# Patient Record
Sex: FEMALE | Race: BLACK OR AFRICAN AMERICAN | NOT HISPANIC OR LATINO | Employment: FULL TIME | URBAN - METROPOLITAN AREA
[De-identification: names, ages, dates, MRNs, and addresses within clinical notes are randomized per-mention and may not be internally consistent; named-entity substitution may affect disease eponyms.]

---

## 2022-05-15 ENCOUNTER — HOSPITAL ENCOUNTER (OUTPATIENT)
Facility: HOSPITAL | Age: 27
Setting detail: OBSERVATION
Discharge: HOME | End: 2022-05-18
Attending: EMERGENCY MEDICINE | Admitting: INTERNAL MEDICINE
Payer: COMMERCIAL

## 2022-05-15 DIAGNOSIS — N12 PYELONEPHRITIS: Primary | ICD-10-CM

## 2022-05-15 DIAGNOSIS — Z3A.01 LESS THAN 8 WEEKS GESTATION OF PREGNANCY: ICD-10-CM

## 2022-05-15 LAB
ALBUMIN SERPL BCP-MCNC: 4.6 G/DL (ref 3.5–5)
ALP SERPL-CCNC: 48 U/L (ref 34–104)
ALT SERPL W P-5'-P-CCNC: 9 U/L (ref 7–52)
ANION GAP SERPL CALCULATED.3IONS-SCNC: 15 MMOL/L (ref 4–13)
ANISOCYTOSIS BLD QL SMEAR: PRESENT
AST SERPL W P-5'-P-CCNC: 9 U/L (ref 13–39)
BACTERIA UR QL AUTO: ABNORMAL /HPF
BASOPHILS # BLD MANUAL: 0 THOUSAND/UL (ref 0–0.1)
BASOPHILS NFR MAR MANUAL: 0 % (ref 0–1)
BILIRUB SERPL-MCNC: 0.76 MG/DL (ref 0.2–1)
BILIRUB UR QL STRIP: NEGATIVE
BUN SERPL-MCNC: 4 MG/DL (ref 5–25)
CALCIUM SERPL-MCNC: 10.1 MG/DL (ref 8.4–10.2)
CHLORIDE SERPL-SCNC: 95 MMOL/L (ref 96–108)
CLARITY UR: ABNORMAL
CO2 SERPL-SCNC: 19 MMOL/L (ref 21–32)
COLOR UR: YELLOW
CREAT SERPL-MCNC: 0.85 MG/DL (ref 0.6–1.3)
EOSINOPHIL # BLD MANUAL: 0 THOUSAND/UL (ref 0–0.4)
EOSINOPHIL NFR BLD MANUAL: 0 % (ref 0–6)
ERYTHROCYTE [DISTWIDTH] IN BLOOD BY AUTOMATED COUNT: 13.4 % (ref 11.6–15.1)
FLUAV RNA RESP QL NAA+PROBE: NEGATIVE
FLUBV RNA RESP QL NAA+PROBE: NEGATIVE
GFR SERPL CREATININE-BSD FRML MDRD: 93 ML/MIN/1.73SQ M
GLUCOSE SERPL-MCNC: 113 MG/DL (ref 65–140)
GLUCOSE UR STRIP-MCNC: NEGATIVE MG/DL
HCG SERPL QL: POSITIVE
HCT VFR BLD AUTO: 32.7 % (ref 34.8–46.1)
HGB BLD-MCNC: 11.2 G/DL (ref 11.5–15.4)
HGB UR QL STRIP.AUTO: ABNORMAL
KETONES UR STRIP-MCNC: ABNORMAL MG/DL
LACTATE SERPL-SCNC: 1.6 MMOL/L (ref 0.5–2)
LEUKOCYTE ESTERASE UR QL STRIP: ABNORMAL
LG PLATELETS BLD QL SMEAR: PRESENT
LYMPHOCYTES # BLD AUTO: 10 % (ref 14–44)
LYMPHOCYTES # BLD AUTO: 2.51 THOUSAND/UL (ref 0.6–4.47)
MCH RBC QN AUTO: 23.2 PG (ref 26.8–34.3)
MCHC RBC AUTO-ENTMCNC: 34.3 G/DL (ref 31.4–37.4)
MCV RBC AUTO: 68 FL (ref 82–98)
MONOCYTES # BLD AUTO: 2.26 THOUSAND/UL (ref 0–1.22)
MONOCYTES NFR BLD: 9 % (ref 4–12)
NEUTROPHILS # BLD MANUAL: 20.32 THOUSAND/UL (ref 1.85–7.62)
NEUTS SEG NFR BLD AUTO: 81 % (ref 43–75)
NITRITE UR QL STRIP: NEGATIVE
NON-SQ EPI CELLS URNS QL MICRO: ABNORMAL /HPF
OTHER STN SPEC: ABNORMAL
OVALOCYTES BLD QL SMEAR: PRESENT
PH UR STRIP.AUTO: 7.5 [PH]
PLATELET # BLD AUTO: 262 THOUSANDS/UL (ref 149–390)
PLATELET BLD QL SMEAR: ADEQUATE
PMV BLD AUTO: 10.5 FL (ref 8.9–12.7)
POTASSIUM SERPL-SCNC: 3.3 MMOL/L (ref 3.5–5.3)
PROT SERPL-MCNC: 8.5 G/DL (ref 6.4–8.4)
PROT UR STRIP-MCNC: ABNORMAL MG/DL
RBC # BLD AUTO: 4.82 MILLION/UL (ref 3.81–5.12)
RBC #/AREA URNS AUTO: ABNORMAL /HPF
RBC MORPH BLD: PRESENT
RSV RNA RESP QL NAA+PROBE: NEGATIVE
SARS-COV-2 RNA RESP QL NAA+PROBE: NEGATIVE
SODIUM SERPL-SCNC: 129 MMOL/L (ref 135–147)
SP GR UR STRIP.AUTO: 1.01 (ref 1–1.03)
UROBILINOGEN UR QL STRIP.AUTO: 0.2 E.U./DL
WBC # BLD AUTO: 25.09 THOUSAND/UL (ref 4.31–10.16)
WBC #/AREA URNS AUTO: ABNORMAL /HPF
WBC CLUMPS # UR AUTO: PRESENT /UL

## 2022-05-15 PROCEDURE — 85027 COMPLETE CBC AUTOMATED: CPT | Performed by: EMERGENCY MEDICINE

## 2022-05-15 PROCEDURE — 81001 URINALYSIS AUTO W/SCOPE: CPT | Performed by: EMERGENCY MEDICINE

## 2022-05-15 PROCEDURE — 96365 THER/PROPH/DIAG IV INF INIT: CPT

## 2022-05-15 PROCEDURE — 87086 URINE CULTURE/COLONY COUNT: CPT | Performed by: EMERGENCY MEDICINE

## 2022-05-15 PROCEDURE — 99285 EMERGENCY DEPT VISIT HI MDM: CPT

## 2022-05-15 PROCEDURE — 36415 COLL VENOUS BLD VENIPUNCTURE: CPT | Performed by: EMERGENCY MEDICINE

## 2022-05-15 PROCEDURE — 84703 CHORIONIC GONADOTROPIN ASSAY: CPT | Performed by: EMERGENCY MEDICINE

## 2022-05-15 PROCEDURE — 87077 CULTURE AEROBIC IDENTIFY: CPT | Performed by: EMERGENCY MEDICINE

## 2022-05-15 PROCEDURE — 80053 COMPREHEN METABOLIC PANEL: CPT | Performed by: EMERGENCY MEDICINE

## 2022-05-15 PROCEDURE — 85007 BL SMEAR W/DIFF WBC COUNT: CPT | Performed by: EMERGENCY MEDICINE

## 2022-05-15 PROCEDURE — 87040 BLOOD CULTURE FOR BACTERIA: CPT | Performed by: EMERGENCY MEDICINE

## 2022-05-15 PROCEDURE — 0241U HB NFCT DS VIR RESP RNA 4 TRGT: CPT | Performed by: EMERGENCY MEDICINE

## 2022-05-15 PROCEDURE — 83605 ASSAY OF LACTIC ACID: CPT | Performed by: EMERGENCY MEDICINE

## 2022-05-15 PROCEDURE — 87186 SC STD MICRODIL/AGAR DIL: CPT | Performed by: EMERGENCY MEDICINE

## 2022-05-15 PROCEDURE — 99284 EMERGENCY DEPT VISIT MOD MDM: CPT | Performed by: EMERGENCY MEDICINE

## 2022-05-15 RX ORDER — CEFTRIAXONE 1 G/50ML
1000 INJECTION, SOLUTION INTRAVENOUS ONCE
Status: COMPLETED | OUTPATIENT
Start: 2022-05-15 | End: 2022-05-16

## 2022-05-15 RX ORDER — ACETAMINOPHEN 325 MG/1
650 TABLET ORAL ONCE
Status: COMPLETED | OUTPATIENT
Start: 2022-05-15 | End: 2022-05-15

## 2022-05-15 RX ADMIN — CEFTRIAXONE 1000 MG: 1 INJECTION, SOLUTION INTRAVENOUS at 23:40

## 2022-05-15 RX ADMIN — SODIUM CHLORIDE 1000 ML: 0.9 INJECTION, SOLUTION INTRAVENOUS at 22:06

## 2022-05-15 RX ADMIN — ACETAMINOPHEN 650 MG: 325 TABLET, FILM COATED ORAL at 21:23

## 2022-05-15 NOTE — LETTER
[address] 3RD  FLOOR MED SURG UNIT  [address] [place] [address]  Dept: [phone]    May 18, 2022     Patient: Jacque Carson   YOB: 1995   Date of Visit: 5/15/2022       To Whom it May Concern:    Jacque Carson is under my professional care  She was seen in the hospital from 5/15/2022   to 05/18/22  She may return to work on 5/19/22 without limitations  If you have any questions or concerns, please don't hesitate to call           Sincerely,          Eda Palacios PA-C

## 2022-05-16 ENCOUNTER — APPOINTMENT (OUTPATIENT)
Dept: ULTRASOUND IMAGING | Facility: HOSPITAL | Age: 27
End: 2022-05-16
Payer: COMMERCIAL

## 2022-05-16 DIAGNOSIS — Z11.3 SCREENING FOR STD (SEXUALLY TRANSMITTED DISEASE): ICD-10-CM

## 2022-05-16 DIAGNOSIS — Z3A.01 LESS THAN 8 WEEKS GESTATION OF PREGNANCY: Primary | ICD-10-CM

## 2022-05-16 PROBLEM — A59.9 TRICHOMONAS VAGINALIS INFECTION: Status: ACTIVE | Noted: 2022-05-16

## 2022-05-16 PROBLEM — E87.6 HYPOKALEMIA: Status: ACTIVE | Noted: 2022-05-16

## 2022-05-16 PROBLEM — E87.1 HYPONATREMIA: Status: ACTIVE | Noted: 2022-05-16

## 2022-05-16 PROBLEM — A41.9 SEPSIS (HCC): Status: ACTIVE | Noted: 2022-05-16

## 2022-05-16 PROBLEM — N12 PYELONEPHRITIS: Status: ACTIVE | Noted: 2022-05-16

## 2022-05-16 LAB
ANION GAP SERPL CALCULATED.3IONS-SCNC: 11 MMOL/L (ref 4–13)
ANION GAP SERPL CALCULATED.3IONS-SCNC: 8 MMOL/L (ref 4–13)
BASOPHILS # BLD AUTO: 0.05 THOUSANDS/ΜL (ref 0–0.1)
BASOPHILS NFR BLD AUTO: 0 % (ref 0–1)
BUN SERPL-MCNC: 4 MG/DL (ref 5–25)
BUN SERPL-MCNC: 4 MG/DL (ref 5–25)
CALCIUM SERPL-MCNC: 9.5 MG/DL (ref 8.4–10.2)
CALCIUM SERPL-MCNC: 9.5 MG/DL (ref 8.4–10.2)
CHLORIDE SERPL-SCNC: 102 MMOL/L (ref 96–108)
CHLORIDE SERPL-SCNC: 103 MMOL/L (ref 96–108)
CO2 SERPL-SCNC: 21 MMOL/L (ref 21–32)
CO2 SERPL-SCNC: 22 MMOL/L (ref 21–32)
CREAT SERPL-MCNC: 0.65 MG/DL (ref 0.6–1.3)
CREAT SERPL-MCNC: 0.66 MG/DL (ref 0.6–1.3)
CRP SERPL QL: 138.4 MG/L
EOSINOPHIL # BLD AUTO: 0.02 THOUSAND/ΜL (ref 0–0.61)
EOSINOPHIL NFR BLD AUTO: 0 % (ref 0–6)
ERYTHROCYTE [DISTWIDTH] IN BLOOD BY AUTOMATED COUNT: 13.6 % (ref 11.6–15.1)
ERYTHROCYTE [DISTWIDTH] IN BLOOD BY AUTOMATED COUNT: 13.8 % (ref 11.6–15.1)
GFR SERPL CREATININE-BSD FRML MDRD: 120 ML/MIN/1.73SQ M
GFR SERPL CREATININE-BSD FRML MDRD: 121 ML/MIN/1.73SQ M
GLUCOSE P FAST SERPL-MCNC: 97 MG/DL (ref 65–99)
GLUCOSE SERPL-MCNC: 104 MG/DL (ref 65–140)
GLUCOSE SERPL-MCNC: 97 MG/DL (ref 65–140)
HCT VFR BLD AUTO: 30.6 % (ref 34.8–46.1)
HCT VFR BLD AUTO: 31.6 % (ref 34.8–46.1)
HGB BLD-MCNC: 10.3 G/DL (ref 11.5–15.4)
HGB BLD-MCNC: 10.6 G/DL (ref 11.5–15.4)
IMM GRANULOCYTES # BLD AUTO: 0.11 THOUSAND/UL (ref 0–0.2)
IMM GRANULOCYTES NFR BLD AUTO: 1 % (ref 0–2)
LACTATE SERPL-SCNC: 0.7 MMOL/L (ref 0.5–2)
LIPASE SERPL-CCNC: 8 U/L (ref 11–82)
LIPASE SERPL-CCNC: <6 U/L (ref 11–82)
LYMPHOCYTES # BLD AUTO: 2.52 THOUSANDS/ΜL (ref 0.6–4.47)
LYMPHOCYTES NFR BLD AUTO: 13 % (ref 14–44)
MCH RBC QN AUTO: 23.1 PG (ref 26.8–34.3)
MCH RBC QN AUTO: 23.2 PG (ref 26.8–34.3)
MCHC RBC AUTO-ENTMCNC: 33.5 G/DL (ref 31.4–37.4)
MCHC RBC AUTO-ENTMCNC: 33.7 G/DL (ref 31.4–37.4)
MCV RBC AUTO: 69 FL (ref 82–98)
MCV RBC AUTO: 69 FL (ref 82–98)
MONOCYTES # BLD AUTO: 1.93 THOUSAND/ΜL (ref 0.17–1.22)
MONOCYTES NFR BLD AUTO: 10 % (ref 4–12)
NEUTROPHILS # BLD AUTO: 15.37 THOUSANDS/ΜL (ref 1.85–7.62)
NEUTS SEG NFR BLD AUTO: 76 % (ref 43–75)
NRBC BLD AUTO-RTO: 0 /100 WBCS
PLATELET # BLD AUTO: 255 THOUSANDS/UL (ref 149–390)
PLATELET # BLD AUTO: 289 THOUSANDS/UL (ref 149–390)
PMV BLD AUTO: 11.3 FL (ref 8.9–12.7)
PMV BLD AUTO: 11.5 FL (ref 8.9–12.7)
POTASSIUM SERPL-SCNC: 3.8 MMOL/L (ref 3.5–5.3)
POTASSIUM SERPL-SCNC: 3.9 MMOL/L (ref 3.5–5.3)
RBC # BLD AUTO: 4.45 MILLION/UL (ref 3.81–5.12)
RBC # BLD AUTO: 4.57 MILLION/UL (ref 3.81–5.12)
SODIUM SERPL-SCNC: 132 MMOL/L (ref 135–147)
SODIUM SERPL-SCNC: 135 MMOL/L (ref 135–147)
WBC # BLD AUTO: 16.29 THOUSAND/UL (ref 4.31–10.16)
WBC # BLD AUTO: 20 THOUSAND/UL (ref 4.31–10.16)

## 2022-05-16 PROCEDURE — 83690 ASSAY OF LIPASE: CPT | Performed by: INTERNAL MEDICINE

## 2022-05-16 PROCEDURE — 99220 PR INITIAL OBSERVATION CARE/DAY 70 MINUTES: CPT | Performed by: INTERNAL MEDICINE

## 2022-05-16 PROCEDURE — 80048 BASIC METABOLIC PNL TOTAL CA: CPT

## 2022-05-16 PROCEDURE — 83605 ASSAY OF LACTIC ACID: CPT | Performed by: INTERNAL MEDICINE

## 2022-05-16 PROCEDURE — 85025 COMPLETE CBC W/AUTO DIFF WBC: CPT

## 2022-05-16 PROCEDURE — 80048 BASIC METABOLIC PNL TOTAL CA: CPT | Performed by: INTERNAL MEDICINE

## 2022-05-16 PROCEDURE — 86140 C-REACTIVE PROTEIN: CPT | Performed by: INTERNAL MEDICINE

## 2022-05-16 PROCEDURE — 85027 COMPLETE CBC AUTOMATED: CPT | Performed by: INTERNAL MEDICINE

## 2022-05-16 PROCEDURE — 76770 US EXAM ABDO BACK WALL COMP: CPT

## 2022-05-16 RX ORDER — PYRIDOXINE HCL (VITAMIN B6) 50 MG
50 TABLET ORAL DAILY PRN
Status: DISCONTINUED | OUTPATIENT
Start: 2022-05-16 | End: 2022-05-18 | Stop reason: HOSPADM

## 2022-05-16 RX ORDER — SODIUM CHLORIDE 9 MG/ML
75 INJECTION, SOLUTION INTRAVENOUS CONTINUOUS
Status: DISCONTINUED | OUTPATIENT
Start: 2022-05-16 | End: 2022-05-18

## 2022-05-16 RX ORDER — ACETAMINOPHEN 325 MG/1
650 TABLET ORAL EVERY 6 HOURS PRN
Status: DISCONTINUED | OUTPATIENT
Start: 2022-05-16 | End: 2022-05-18 | Stop reason: HOSPADM

## 2022-05-16 RX ORDER — CEFTRIAXONE 1 G/50ML
1000 INJECTION, SOLUTION INTRAVENOUS EVERY 24 HOURS
Status: DISCONTINUED | OUTPATIENT
Start: 2022-05-16 | End: 2022-05-18 | Stop reason: HOSPADM

## 2022-05-16 RX ORDER — METRONIDAZOLE 500 MG/1
2000 TABLET ORAL ONCE
Status: COMPLETED | OUTPATIENT
Start: 2022-05-16 | End: 2022-05-16

## 2022-05-16 RX ORDER — POTASSIUM CHLORIDE 20 MEQ/1
40 TABLET, EXTENDED RELEASE ORAL ONCE
Status: COMPLETED | OUTPATIENT
Start: 2022-05-16 | End: 2022-05-16

## 2022-05-16 RX ADMIN — MORPHINE SULFATE 2 MG: 2 INJECTION, SOLUTION INTRAMUSCULAR; INTRAVENOUS at 09:25

## 2022-05-16 RX ADMIN — SODIUM CHLORIDE 100 ML/HR: 0.9 INJECTION, SOLUTION INTRAVENOUS at 01:24

## 2022-05-16 RX ADMIN — ACETAMINOPHEN 650 MG: 325 TABLET, FILM COATED ORAL at 17:45

## 2022-05-16 RX ADMIN — METRONIDAZOLE 2000 MG: 500 TABLET ORAL at 00:50

## 2022-05-16 RX ADMIN — CEFTRIAXONE 1000 MG: 1 INJECTION, SOLUTION INTRAVENOUS at 12:52

## 2022-05-16 RX ADMIN — POTASSIUM CHLORIDE 40 MEQ: 1500 TABLET, EXTENDED RELEASE ORAL at 03:26

## 2022-05-16 RX ADMIN — ACETAMINOPHEN 650 MG: 325 TABLET, FILM COATED ORAL at 04:28

## 2022-05-16 RX ADMIN — SODIUM CHLORIDE 100 ML/HR: 0.9 INJECTION, SOLUTION INTRAVENOUS at 10:30

## 2022-05-16 RX ADMIN — ACETAMINOPHEN 650 MG: 325 TABLET, FILM COATED ORAL at 10:27

## 2022-05-16 NOTE — QUICK NOTE
Patient's family member Mr Anson Chacon who is noted to be as the spouse in the emergency contact became very agitated and irritated when I spoke to him about the patient's clinical condition  Pt  Was seen by myself early in the morning and I updated the clinical condition and Mr Aziza Sanchez stated that he has been waiting all day waiting for communication from the staff  He mention to the nurse that he was the   of the patient ,   As he was walking to the nursing station asking for the charge nurse, I met him in the hallway and spoke to him about his wife and he stated that I have never met you  and he stated that I have violated the HIPPA policy while he was getting all the information through the day from the nursing staff  Patient was asked about how he is  related to her and she states that he is only the father of the child and her significant other , she is okay with giving out information to Mr Anson Chacon as long as she is aware first   The significant other became very aggressive towards nursing staff asking to talk to the supervisor and the higher ups , he also confronted the radiology technician and stated by the ultrasound was not done all through the day and he stated that he has not been getting any information through the 3 shifts while the patient is here  and Margaux Silva came to talk to him and patient and family were reassured  Patient is apologetic about his behavior and stated that she was frustrated about how it turned out to be

## 2022-05-16 NOTE — ED NOTES
Pt am,bulating to the bathroom with steady gait, denies dizziness     Fatimah Burk, [facility]  05/16/22 9192

## 2022-05-16 NOTE — ASSESSMENT & PLAN NOTE
· Na 129  · Likely hypovolemic due to decreased PO intake and vomiting   · Continue [email or web address]  · Recheck in a m

## 2022-05-16 NOTE — ED PROVIDER NOTES
History  Chief Complaint   Patient presents with    Abdominal Pain     Pt is 5 weeks preganant c/o generalized abdominal pain since Thursday associated witn nausea/vomiting and fever, denies vag bleeding     Patient took a home pregnancy test and is believed to be approximately 5 weeks pregnant  She denies any other associated systemic symptoms  History provided by:  Patient   used: No    Abdominal Pain  Pain location:  Generalized  Pain quality: aching and dull    Pain radiates to:  Does not radiate  Pain severity:  Moderate  Onset quality:  Gradual  Duration:  3 days  Timing:  Intermittent  Progression:  Waxing and waning  Chronicity:  New  Context: not eating, not recent illness, not sick contacts, not suspicious food intake and not trauma    Relieved by:  Nothing  Worsened by:  Nothing  Associated symptoms: anorexia, fever and nausea    Associated symptoms: no cough, no dysuria, no melena, no shortness of breath, no sore throat, no vaginal bleeding, no vaginal discharge and no vomiting        None       No past medical history on file  No past surgical history on file  No family history on file  I have reviewed and agree with the history as documented  E-Cigarette/Vaping     E-Cigarette/Vaping Substances          Review of Systems   Constitutional: Positive for fever  HENT: Negative for sore throat  Respiratory: Negative for cough and shortness of breath  Gastrointestinal: Positive for abdominal pain, anorexia and nausea  Negative for melena and vomiting  Genitourinary: Negative for dysuria, vaginal bleeding and vaginal discharge  All other systems reviewed and are negative  Physical Exam  Physical Exam  Vitals and nursing note reviewed  Constitutional:       General: She is not in acute distress  Appearance: She is well-developed  She is ill-appearing  HENT:      Head: Normocephalic and atraumatic     Eyes:      Conjunctiva/sclera: Conjunctivae normal    Cardiovascular:      Rate and Rhythm: Normal rate and regular rhythm  Heart sounds: No murmur heard  Pulmonary:      Effort: Pulmonary effort is normal  No respiratory distress  Breath sounds: Normal breath sounds  Abdominal:      General: Bowel sounds are normal  There are no signs of injury  Palpations: Abdomen is soft  There is no mass  Tenderness: There is generalized abdominal tenderness  There is no guarding or rebound  Hernia: No hernia is present  Musculoskeletal:      Cervical back: Neck supple  Skin:     General: Skin is warm and dry  Capillary Refill: Capillary refill takes less than 2 seconds  Neurological:      General: No focal deficit present  Mental Status: She is alert and oriented to person, place, and time  Cranial Nerves: No cranial nerve deficit  Motor: No weakness           Vital Signs  ED Triage Vitals [05/15/22 2057]   Temperature Pulse Respirations Blood Pressure SpO2   (!) 101 2 °F (38 4 °C) 104 17 141/94 100 %      Temp Source Heart Rate Source Patient Position - Orthostatic VS BP Location FiO2 (%)   Oral Monitor Lying Right arm --      Pain Score       7           Vitals:    05/15/22 2057 05/15/22 2301   BP: 141/94 113/61   Pulse: 104 89   Patient Position - Orthostatic VS: Lying Lying         Visual Acuity      ED Medications  Medications   metroNIDAZOLE (FLAGYL) tablet 2,000 mg (has no administration in time range)   acetaminophen (TYLENOL) tablet 650 mg (650 mg Oral Given 5/15/22 2123)   sodium chloride 0 9 % bolus 1,000 mL (0 mL Intravenous Stopped 5/15/22 2227)   cefTRIAXone (ROCEPHIN) IVPB (premix in dextrose) 1,000 mg 50 mL (1,000 mg Intravenous New Bag 5/15/22 2340)       Diagnostic Studies  Results Reviewed     Procedure Component Value Units Date/Time    Urine Microscopic [[id number]]  (Abnormal) Collected: 05/15/22 2234    Lab Status: Final result Specimen: Urine, Clean Catch Updated: 05/15/22 2254     RBC, UA 4-10 /hpf      WBC, UA 20-30 /hpf      Epithelial Cells Moderate /hpf      Bacteria, UA Occasional /hpf      WBC Clumps present     OTHER OBSERVATIONS Trichomonas Organisms Present     URINE COMMENT --    hCG, qualitative pregnancy [[id number]]  (Abnormal) Collected: 05/15/22 2150    Lab Status: Final result Specimen: Blood from Arm, Right Updated: 05/15/22 2242     Preg, Serum Positive    UA w Reflex to Microscopic w Reflex to Culture [[id number]]  (Abnormal) Collected: 05/15/22 2234    Lab Status: Final result Specimen: Urine, Clean Catch Updated: 05/15/22 2242     Color, UA Yellow     Clarity, UA Slightly Cloudy     Specific [place], UA 1 010     pH, UA 7 5     Leukocytes, UA 3+     Nitrite, UA Negative     Protein, UA Trace mg/dl      Glucose, UA Negative mg/dl      Ketones, UA 40 (2+) mg/dl      Urobilinogen, UA 0 2 E U /dl      Bilirubin, UA Negative     Blood, UA 2+     URINE COMMENT --    Urine culture [[id number]] Collected: 05/15/22 2234    Lab Status: In process Specimen: Urine, Clean Catch Updated: 05/15/22 2242    COVID/FLU/RSV - 2 hour TAT [[id number]]  (Normal) Collected: 05/15/22 2116    Lab Status: Final result Specimen: Nares from Nose Updated: 05/15/22 2201     SARS-CoV-2 Negative     INFLUENZA A PCR Negative     INFLUENZA B PCR Negative     RSV PCR Negative    Narrative:      FOR PEDIATRIC PATIENTS - copy/paste COVID Guidelines URL to browser: [email or web address]    SARS-CoV-2 assay is a Nucleic Acid Amplification assay intended for the  qualitative detection of nucleic acid from SARS-CoV-2 in nasopharyngeal  swabs  Results are for the presumptive identification of SARS-CoV-2 RNA  Positive results are indicative of infection with SARS-CoV-2, the virus  causing COVID-19, but do not rule out bacterial infection or co-infection  with other viruses   Laboratories within the [place] and its  territories are required to report all positive results to the appropriate  public health authorities  Negative results do not preclude SARS-CoV-2  infection and should not be used as the sole basis for treatment or other  patient management decisions  Negative results must be combined with  clinical observations, patient history, and epidemiological information  This test has not been FDA cleared or approved  This test has been authorized by FDA under an Emergency Use Authorization  (EUA)  This test is only authorized for the duration of time the  declaration that circumstances exist justifying the authorization of the  emergency use of an in vitro diagnostic tests for detection of SARS-CoV-2  virus and/or diagnosis of COVID-19 infection under section 564(b)(1) of  the Act, 21 U  S C  578CZG-7(R)(1), unless the authorization is terminated  or revoked sooner  The test has been validated but independent review by FDA  and CLIA is pending  Test performed using [employer] GeneXpert: This RT-PCR assay targets N2,  a region unique to SARS-CoV-2  A conserved region in the E-gene was chosen  for pan-Sarbecovirus detection which includes SARS-CoV-2  CBC and differential [[id number]]  (Abnormal) Collected: 05/15/22 2116    Lab Status: Final result Specimen: Blood from Arm, Right Updated: 05/15/22 2156     WBC 25 09 Thousand/uL      RBC 4 82 Million/uL      Hemoglobin 11 2 g/dL      Hematocrit 32 7 %      MCV 68 fL      MCH 23 2 pg      MCHC 34 3 g/dL      RDW 13 4 %      MPV 10 5 fL      Platelets 299 Thousands/uL     Narrative: This is an appended report  These results have been appended to a previously verified report      Manual Differential(PHLEBS Do Not Order) [[id number]]  (Abnormal) Collected: 05/15/22 2116    Lab Status: Final result Specimen: Blood from Arm, Right Updated: 05/15/22 2156     Segmented % 81 %      Lymphocytes % 10 %      Monocytes % 9 %      Eosinophils, % 0 %      Basophils % 0 %      Absolute Neutrophils 20 32 Thousand/uL      Lymphocytes Absolute 2 51 Thousand/uL      Monocytes Absolute 2 26 Thousand/uL      Eosinophils Absolute 0 00 Thousand/uL      Basophils Absolute 0 00 Thousand/uL      Total Counted --     RBC Morphology Present     Anisocytosis Present     Ovalocytes Present     Platelet Estimate Adequate     Large Platelet Present    Comprehensive metabolic panel [[id number]]  (Abnormal) Collected: 05/15/22 2116    Lab Status: Final result Specimen: Blood from Arm, Right Updated: 05/15/22 2140     Sodium 129 mmol/L      Potassium 3 3 mmol/L      Chloride 95 mmol/L      CO2 19 mmol/L      ANION GAP 15 mmol/L      BUN 4 mg/dL      Creatinine 0 85 mg/dL      Glucose 113 mg/dL      Calcium 10 1 mg/dL      AST 9 U/L      ALT 9 U/L      Alkaline Phosphatase 48 U/L      Total Protein 8 5 g/dL      Albumin 4 6 g/dL      Total Bilirubin 0 76 mg/dL      eGFR 93 ml/min/1 73sq m     Narrative:      [place] guidelines for Chronic Kidney Disease (CKD):     Stage 1 with normal or high GFR (GFR > 90 mL/min/1 73 square meters)    Stage 2 Mild CKD (GFR = 60-89 mL/min/1 73 square meters)    Stage 3A Moderate CKD (GFR = 45-59 mL/min/1 73 square meters)    Stage 3B Moderate CKD (GFR = 30-44 mL/min/1 73 square meters)    Stage 4 Severe CKD (GFR = 15-29 mL/min/1 73 square meters)    Stage 5 End Stage CKD (GFR <15 mL/min/1 73 square meters)  Note: GFR calculation is accurate only with a steady state creatinine    Lactic acid [[id number]]  (Normal) Collected: 05/15/22 2116    Lab Status: Final result Specimen: Blood from Arm, Right Updated: 05/15/22 2140     LACTIC ACID 1 6 mmol/L     Narrative:      Result may be elevated if tourniquet was used during collection  Blood culture #1 [[id number]] Collected: 05/15/22 2123    Lab Status: In process Specimen: Blood from Arm, Left Updated: 05/15/22 2127    Blood culture #2 [[id number]] Collected: 05/15/22 2116    Lab Status:  In process Specimen: Blood from Arm, Right Updated: 05/15/22 2121 CT abdomen pelvis wo contrast    (Results Pending)              Procedures  Procedures         ED Course                                             MDM  Number of Diagnoses or Management Options     Amount and/or Complexity of Data Reviewed  Clinical lab tests: ordered and reviewed  Review and summarize past medical records: yes    Risk of Complications, Morbidity, and/or Mortality  Presenting problems: moderate  Diagnostic procedures: moderate  Management options: moderate  General comments: Labs and imaging reviewed with patient  Clinically she is much improved and no longer has any abdominal pain  Repeat abdominal examination is benign  Patient has some ice chips and is tolerating p o  Oral liquids  Have given her an IV dose of ceftriaxone to treat her presumed pyelonephritis  She is also being treated for trichomoniasis with a 1 time dose of Flagyl  Due to her presenting symptoms, her leukocytosis and hyponatremia I will be observing her in the hospital overnight for IV hydration as an serial re-evaluations  Case was also discussed with OBGYN service at [facility]  Admitting provider contact    Patient Progress  Patient progress: improved      Disposition  Final diagnoses:   Pyelonephritis     Time reflects when diagnosis was documented in both MDM as applicable and the Disposition within this note     Time User Action Codes Description Comment    5/16/2022 12:31 AM Heather Eucdea Add [N12] Pyelonephritis       ED Disposition     ED Disposition   Admit    Condition   Stable    Date/Time   Mon May 16, 2022 12:31 AM    Comment   Case was discussed with RONNA and the patient's admission status was agreed to be Admission Status: observation status to the service of Dr Jemal Lai   Follow-up Information    None         Patient's Medications    No medications on file       No discharge procedures on file      PDMP Review     None          ED Provider  Electronically Signed by           Angela Hui MD  05/16/22 6408

## 2022-05-16 NOTE — ASSESSMENT & PLAN NOTE
· Treated with 2g metronidazole in ED  · Advised partner treatment and abstinence from sexual intercourse for 7d post treatment

## 2022-05-16 NOTE — H&P
[address]  H&P- Adolfo Burks 1995, 28 y o  female MRN: [id number]  Unit/Bed#: -01 Encounter: [id number]  Primary Care Provider: No primary care provider on file  Date and time admitted to hospital: 5/15/2022  8:51 PM    * Pyelonephritis  Assessment & Plan  · C/o of fever, nausea, flank pain with positive UA  · See plan under sepsis    Sepsis ([address] )  Assessment & Plan  · POA with leukocytosis, tachycardia, febrile   · Source: pyelo  · Lactate: 1 6  · IVF: 1L NS  · IV abx: Rocephin     Plan   · ED physician discussed with obstetrics who stated patient is appropriate for observation at [facility] for further IV abx  · Continue Rocephin  · Continue [email or web address]  · Trend WBCs/fever curve   · Follow up urine/blood culture     Trichomonas vaginalis infection  Assessment & Plan  · Treated with 2g metronidazole in ED  · Advised partner treatment and abstinence from sexual intercourse for 7d post treatment     Hypokalemia  Assessment & Plan  · K 3 3  · Monitor and replete      Less than 8 weeks gestation of pregnancy  Assessment & Plan  · Approximately 5 weeks pregnant  · Continue prenatal care     Hyponatremia  Assessment & Plan  · Na 129  · Likely hypovolemic due to decreased PO intake and vomiting   · Continue [email or web address]  · Recheck in a m  VTE Pharmacologic Prophylaxis: VTE Score: 1 Low Risk (Score 0-2) - Encourage Ambulation  Code Status: Level 1 - Full Code   Discussion with family: Patient declined call to   Anticipated Length of Stay: Patient will be admitted on an observation basis with an anticipated length of stay of less than 2 midnights secondary to IV antibiotics  Total Time for Visit, including Counseling / Coordination of Care: 60 minutes Greater than 50% of this total time spent on direct patient counseling and coordination of care      Chief Complaint: abdominal pain     History of Present Illness:  Adolfo Burks is a 28 y o  female who presents with worsening generalized abdominal pain over past 3 days  Reports associated fevers, chills, N/V  She denies dysuria, hematuria  Does complain of intermittent bilateral flank pain worse on the right  Of note, patient took an at home pregnancy test and is approximately 5 weeks pregnant  In ED, patient with UA positive and appears with pyelonephritis  Patient also meeting sepsis criteria with leukocytosis, tachycardia and fever  Also tested positive for trichomonas  K 3 3  Na 129  ED physician discussed with obstetrics who stated patient is appropriate for d/c or observation at [facility] for further IV abs/fluids  Review of Systems:  Review of Systems   Constitutional: Positive for chills, fatigue and fever  Negative for diaphoresis  HENT: Negative for congestion  Eyes: Negative for visual disturbance  Respiratory: Positive for shortness of breath  Cardiovascular: Negative for chest pain, palpitations and leg swelling  Gastrointestinal: Positive for abdominal pain, nausea and vomiting  Negative for blood in stool and diarrhea  Endocrine: Negative for polyuria  Genitourinary: Positive for flank pain  Negative for dysuria and hematuria  Skin: Negative for rash  Neurological: Negative for dizziness and headaches  Past Medical and Surgical History:   No past medical history on file  No past surgical history on file  Meds/Allergies:  Prior to Admission medications    Not on File     I have reviewed home medications with patient personally      Allergies: No Known Allergies    Social History:  Marital Status: Unknown   Occupation:   Patient Pre-hospital Living Situation: Home  Patient Pre-hospital Level of Mobility: walks  Patient Pre-hospital Diet Restrictions: none  Substance Use History:   Social History     Substance and Sexual Activity   Alcohol Use Yes    Alcohol/week: 1 0 standard drink    Types: 1 Shots of liquor per week     Social History     Tobacco Use   Smoking Status Never Smoker   Smokeless Tobacco Not on file     Social History     Substance and Sexual Activity   Drug Use Yes    Types: Marijuana    Comment: daily       Family History:  No family history on file  Physical Exam:     Vitals:   Blood Pressure: 115/69 (05/16/22 0121)  Pulse: 78 (05/16/22 0121)  Temperature: 97 9 °F (36 6 °C) (05/16/22 0121)  Temp Source: Tympanic (05/16/22 0121)  Respirations: 18 (05/16/22 0121)  Height: 5' 7" (170 2 cm) (05/15/22 2057)  Weight - Scale: 64 kg (141 lb) (05/15/22 2057)  SpO2: 100 % (05/16/22 0121)    Physical Exam  Vitals and nursing note reviewed  Constitutional:       General: She is not in acute distress  Appearance: Normal appearance  She is not toxic-appearing  HENT:      Head: Normocephalic and atraumatic  Mouth/Throat:      Mouth: Mucous membranes are moist    Cardiovascular:      Rate and Rhythm: Regular rhythm  Tachycardia present  Pulses: Normal pulses  Heart sounds: Normal heart sounds  No murmur heard  Pulmonary:      Effort: Pulmonary effort is normal  No respiratory distress  Breath sounds: Normal breath sounds  No wheezing or rales  Abdominal:      General: Abdomen is flat  Bowel sounds are normal  There is no distension  Palpations: Abdomen is soft  Tenderness: There is abdominal tenderness (generalized)  There is right CVA tenderness and left CVA tenderness  There is no guarding  Musculoskeletal:      Right lower leg: No edema  Left lower leg: No edema  Skin:     General: Skin is warm and dry  Capillary Refill: Capillary refill takes less than 2 seconds  Neurological:      General: No focal deficit present  Mental Status: She is alert and oriented to person, place, and time     Psychiatric:         Mood and Affect: Mood normal          Behavior: Behavior normal         Additional Data:   Lab Results:  Results from last 7 days   Lab Units 05/15/22  2116   WBC Thousand/uL 25 09*   HEMOGLOBIN g/dL 11 2* HEMATOCRIT % 32 7*   PLATELETS Thousands/uL 262   LYMPHO PCT % 10*   MONO PCT % 9   EOS PCT % 0     Results from last 7 days   Lab Units 05/15/22  2116   SODIUM mmol/L 129*   POTASSIUM mmol/L 3 3*   CHLORIDE mmol/L 95*   CO2 mmol/L 19*   BUN mg/dL 4*   CREATININE mg/dL 0 85   ANION GAP mmol/L 15*   CALCIUM mg/dL 10 1   ALBUMIN g/dL 4 6   TOTAL BILIRUBIN mg/dL 0 76   ALK PHOS U/L 48   ALT U/L 9   AST U/L 9*   GLUCOSE RANDOM mg/dL 113                 Results from last 7 days   Lab Units 05/15/22  2116   LACTIC ACID mmol/L 1 6       Imaging: No pertinent imaging reviewed  No orders to display       EKG and Other Studies Reviewed on Admission:   · EKG: No EKG obtained  ** Please Note: This note has been constructed using a voice recognition system   **

## 2022-05-16 NOTE — ASSESSMENT & PLAN NOTE
· POA with leukocytosis, tachycardia, febrile   · Source: pyelo  · Lactate: 1 6  · IVF: 1L NS  · IV abx: Rocephin     Plan   · ED physician discussed with obstetrics who stated patient is appropriate for observation at [facility] for further IV abx  · Continue Rocephin  · Continue [email or web address]  · Trend WBCs/fever curve   · Follow up urine/blood culture

## 2022-05-17 ENCOUNTER — APPOINTMENT (OUTPATIENT)
Dept: ULTRASOUND IMAGING | Facility: HOSPITAL | Age: 27
End: 2022-05-17
Payer: COMMERCIAL

## 2022-05-17 LAB
ALBUMIN SERPL BCP-MCNC: 3.8 G/DL (ref 3.5–5)
ALP SERPL-CCNC: 39 U/L (ref 34–104)
ALT SERPL W P-5'-P-CCNC: 5 U/L (ref 7–52)
ANION GAP SERPL CALCULATED.3IONS-SCNC: 10 MMOL/L (ref 4–13)
AST SERPL W P-5'-P-CCNC: 7 U/L (ref 13–39)
B-HCG SERPL-ACNC: 5246 MIU/ML (ref 0–11.6)
BILIRUB SERPL-MCNC: 0.38 MG/DL (ref 0.2–1)
BUN SERPL-MCNC: 2 MG/DL (ref 5–25)
CALCIUM SERPL-MCNC: 9 MG/DL (ref 8.4–10.2)
CHLORIDE SERPL-SCNC: 103 MMOL/L (ref 96–108)
CO2 SERPL-SCNC: 22 MMOL/L (ref 21–32)
CREAT SERPL-MCNC: 0.72 MG/DL (ref 0.6–1.3)
ERYTHROCYTE [DISTWIDTH] IN BLOOD BY AUTOMATED COUNT: 13.8 % (ref 11.6–15.1)
GFR SERPL CREATININE-BSD FRML MDRD: 114 ML/MIN/1.73SQ M
GLUCOSE P FAST SERPL-MCNC: 82 MG/DL (ref 65–99)
GLUCOSE SERPL-MCNC: 82 MG/DL (ref 65–140)
HCT VFR BLD AUTO: 28.9 % (ref 34.8–46.1)
HGB BLD-MCNC: 9.7 G/DL (ref 11.5–15.4)
MCH RBC QN AUTO: 23.1 PG (ref 26.8–34.3)
MCHC RBC AUTO-ENTMCNC: 33.6 G/DL (ref 31.4–37.4)
MCV RBC AUTO: 69 FL (ref 82–98)
PLATELET # BLD AUTO: 280 THOUSANDS/UL (ref 149–390)
PMV BLD AUTO: 11.3 FL (ref 8.9–12.7)
POTASSIUM SERPL-SCNC: 4.1 MMOL/L (ref 3.5–5.3)
PROT SERPL-MCNC: 6.7 G/DL (ref 6.4–8.4)
RBC # BLD AUTO: 4.2 MILLION/UL (ref 3.81–5.12)
SODIUM SERPL-SCNC: 135 MMOL/L (ref 135–147)
WBC # BLD AUTO: 13.78 THOUSAND/UL (ref 4.31–10.16)

## 2022-05-17 PROCEDURE — 99245 OFF/OP CONSLTJ NEW/EST HI 55: CPT | Performed by: INTERNAL MEDICINE

## 2022-05-17 PROCEDURE — 99225 PR SBSQ OBSERVATION CARE/DAY 25 MINUTES: CPT | Performed by: PHYSICIAN ASSISTANT

## 2022-05-17 PROCEDURE — 76705 ECHO EXAM OF ABDOMEN: CPT

## 2022-05-17 PROCEDURE — 85027 COMPLETE CBC AUTOMATED: CPT | Performed by: INTERNAL MEDICINE

## 2022-05-17 PROCEDURE — 84702 CHORIONIC GONADOTROPIN TEST: CPT | Performed by: PHYSICIAN ASSISTANT

## 2022-05-17 PROCEDURE — 80053 COMPREHEN METABOLIC PANEL: CPT | Performed by: INTERNAL MEDICINE

## 2022-05-17 PROCEDURE — 76815 OB US LIMITED FETUS(S): CPT

## 2022-05-17 PROCEDURE — 99243 OFF/OP CNSLTJ NEW/EST LOW 30: CPT | Performed by: OBSTETRICS & GYNECOLOGY

## 2022-05-17 RX ADMIN — SODIUM CHLORIDE 100 ML/HR: 0.9 INJECTION, SOLUTION INTRAVENOUS at 06:41

## 2022-05-17 RX ADMIN — CEFTRIAXONE 1000 MG: 1 INJECTION, SOLUTION INTRAVENOUS at 12:02

## 2022-05-17 RX ADMIN — SODIUM CHLORIDE 75 ML/HR: 0.9 INJECTION, SOLUTION INTRAVENOUS at 22:01

## 2022-05-17 NOTE — CONSULTS
Consultation - Infectious Disease   Arik Brito 28 y o  female MRN: [id number]  Unit/Bed#: -01 Encounter: [id number]      IMPRESSION & RECOMMENDATIONS:   1  SIRS versus sepsis, POA with fever, tachycardia leukocytosis  Admission blood cultures remain negative to date  Suspect urinary source in patient with right flank tenderness and positive urine culture for Gram-negative rods  -continue ceftriaxone  -follow-up cultures and adjust antibiotics as indicated  -monitor temperature and hemodynamics  -serial exam  -recheck CBC and BMP in a m      2  Right pyelonephritis  Patient was right flank tenderness  Urine culture with [id number] colonies of Gram-negative billy enteric like  Right upper quadrant ultrasound with mild heterogeneity of the cortex suspicious for inflammation  Ultrasound of kidney and bladder with bladder wall thickening  Flank tenderness improving  -antibiotics as above  -follow-up final urine culture and adjust antibiotics accordingly  -anticipate a total 1 week antibiotic course  -monitor temperature and hemodynamics  -serial exam  -follow-up urinary symptoms  -follow-up urine output     3   5-6 weeks gestation of pregnancy  05/15/2022 hCG qualitative pregnancy positive  No vaginal bleeding noted  -Ob following  -follow-up hCG quantitative levels  -follow-up transvaginal pregnancy ultrasound     4  Trichomonas vaginalis infection  -patient treated with 2 g of metronidazole in the ED  -partner should be treated as well  -partner education management per primary care team    Antibiotics:  Ceftriaxone D3    I have discussed the above management plan in detail with patient, RN, and the primary service    Extensive review of the medical records in epic including review of the notes, radiographs, and laboratory results     HISTORY OF PRESENT ILLNESS:  Reason for Consult: 1  Pyelonephritis 2   Less than 8 weeks gestation pregnancy    HPI: Arik Brito is a 28y o  year old female  who took an at home pregnancy test and is approximately 5 weeks pregnant and presented to the ER 5/15/22 with worsening generalized abdominal pain over past 3 days with associated fevers, chills, N/V  She denies dysuria, hematuria  On presentation, she complained of intermittent bilateral flank pain worse on the right  In ED, patient was febrile with tachycardia and leukocytosis and with UA positive  She also tested positive for trichomonas and was treated with 2 g of Flagyl in the ER  Patient was admitted on IV fluids and ceftriaxone  Kidney and right upper quadrant ultrasound is suspicious for right-sided kidney cortex inflammation and bladder wall thickening  Urine cultures growing Gram-negative rods  Infectious Disease now being consulted regarding evaluation a pyelonephritis in setting of less than 8 weeks gestation pregnancy  Patient had fever and chills at night at home and day 1 of admission  Last night she did not have any fever chills  Her abdominal and flank pain is improving  She is able to eat but appetite is light  She denies any further nausea, vomiting in hospital, it was only in home  No dysuria  No diarrhea  No cough or respiratory symptoms  REVIEW OF SYSTEMS:  A complete review of systems is negative other than that noted in the HPI  PAST MEDICAL HISTORY:  No past medical history on file  No past surgical history on file  FAMILY HISTORY:  Non-contributory    SOCIAL HISTORY:  Social History   Social History     Substance and Sexual Activity   Alcohol Use Yes    Alcohol/week: 1 0 standard drink    Types: 1 Shots of liquor per week     Social History     Substance and Sexual Activity   Drug Use Yes    Types: Marijuana    Comment: daily     Social History     Tobacco Use   Smoking Status Never Smoker   Smokeless Tobacco Not on file       ALLERGIES:  No Known Allergies    MEDICATIONS:  All current active medications have been reviewed      PHYSICAL EXAM:  Temp:  [98 1 °F (36 7 °C)-99 4 °F (37 4 °C)] 99 3 °F (37 4 °C)  HR:  [73-88] 73  Resp:  [16-20] 20  BP: (113-137)/() 113/63  SpO2:  [98 %-100 %] 100 %  Temp (24hrs), Av 9 °F (37 2 °C), Min:98 1 °F (36 7 °C), Max:99 4 °F (37 4 °C)  Current: Temperature: 99 3 °F (37 4 °C)    Intake/Output Summary (Last 24 hours) at 2022 1652  Last data filed at 2022 1224  Gross per 24 hour   Intake 1571 67 ml   Output --   Net 1571 67 ml       General Appearance:  28year-old pleasant female, resting comfortably in bed appearing nontoxic and comfortable at present, and in no distress   Head:  Normocephalic, without obvious abnormality, atraumatic   Eyes:  Conjunctiva pink and sclera anicteric, both eyes   Nose: Nares normal, mucosa normal, no drainage   Throat: Oropharynx moist without lesions   Neck: Supple, symmetrical, no adenopathy, no tenderness/mass/nodules   Back:   Symmetric, no curvature, ROM normal, no CVA tenderness   Lungs:   Clear to auscultation bilaterally, respirations unlabored   Chest Wall:  No tenderness or deformity   Heart:  RRR; no murmur, rub or gallop   Abdomen:   Soft, mild generalized right upper quadrant flank discomfort, non-distended, positive bowel sounds    Extremities: No cyanosis, clubbing or edema   Skin: No rashes or lesions  IV site nontender  No draining wounds noted  Lymph nodes: Cervical, supraclavicular nodes normal   Neurologic: Alert and oriented times 3, extremity strength 5/5 and symmetric       LABS, IMAGING, & OTHER STUDIES:  Lab Results:  I have personally reviewed pertinent labs    Results from last 7 days   Lab Units 22  0508 22  0430   WBC Thousand/uL 13 78* 16 29* 20 00*   HEMOGLOBIN g/dL 9 7* 10 3* 10 6*   PLATELETS Thousands/uL 280 289 255     Results from last 7 days   Lab Units 22  0508 22  0430 05/15/22  2116   SODIUM mmol/L 135 132* 135 129*   POTASSIUM mmol/L 4 1 3 8 3 9 3 3*   CHLORIDE mmol/L 103 103 102 95*   CO2 mmol/L 22 21 22 19*   BUN mg/dL 2* 4* 4* 4*   CREATININE mg/dL 0 72 0 65 0 66 0 85   EGFR ml/min/1 73sq m 114 121 120 93   CALCIUM mg/dL 9 0 9 5 9 5 10 1   AST U/L 7*  --   --  9*   ALT U/L 5*  --   --  9   ALK PHOS U/L 39  --   --  48     Results from last 7 days   Lab Units 05/15/22  2234 05/15/22  2123 05/15/22  2116   BLOOD CULTURE   --  No Growth at 24 hrs  No Growth at 24 hrs  URINE CULTURE  50,000-59,000 cfu/ml Gram Negative Nikko Enteric Like*  --   --          Results from last 7 days   Lab Units 05/16/22  0430   CRP mg/L 138 4*               Imaging Studies:   5/17/22 Right upper quadrant ultrasound with mild heterogeneity of the cortex suspicious for inflammation  5/16/22 Ultrasound of kidney and bladder with bladder wall thickening  Other Studies:   I have personally reviewed pertinent reports

## 2022-05-17 NOTE — PLAN OF CARE
Problem: SAFETY ADULT  Goal: Patient will remain free of falls  Description: INTERVENTIONS:  - Educate patient/family on patient safety including physical limitations  - Instruct patient to call for assistance with activity   - Consult OT/PT to assist with strengthening/mobility   - Keep Call bell within reach  - Keep bed low and locked with side rails adjusted as appropriate  - Keep care items and personal belongings within reach  - Initiate and maintain comfort rounds  - Make Fall Risk Sign visible to staff  - Apply yellow socks and bracelet for high fall risk patients  - Consider moving patient to room near nurses station  Outcome: Progressing  Goal: Maintain or return to baseline ADL function  Description: INTERVENTIONS:  -  Assess patient's ability to carry out ADLs; assess patient's baseline for ADL function and identify physical deficits which impact ability to perform ADLs (bathing, care of mouth/teeth, toileting, grooming, dressing, etc )  - Assess/evaluate cause of self-care deficits   - Assess range of motion  - Assess patient's mobility; develop plan if impaired  - Assess patient's need for assistive devices and provide as appropriate  - Encourage maximum independence but intervene and supervise when necessary  - Involve family in performance of ADLs  - Assess for home care needs following discharge   - Consider OT consult to assist with ADL evaluation and planning for discharge  - Provide patient education as appropriate  Outcome: Progressing

## 2022-05-17 NOTE — PLAN OF CARE
Problem: PAIN - ADULT  Goal: Verbalizes/displays adequate comfort level or baseline comfort level  Description: Interventions:  - Encourage patient to monitor pain and request assistance  - Assess pain using appropriate pain scale  - Administer analgesics based on type and severity of pain and evaluate response  - Implement non-pharmacological measures as appropriate and evaluate response  - Consider cultural and social influences on pain and pain management  - Notify physician/advanced practitioner if interventions unsuccessful or patient reports new pain  Outcome: Progressing     Problem: INFECTION - ADULT  Goal: Absence or prevention of progression during hospitalization  Description: INTERVENTIONS:  - Assess and monitor for signs and symptoms of infection  - Monitor lab/diagnostic results  - Monitor all insertion sites, i e  indwelling lines, tubes, and drains  - Monitor endotracheal if appropriate and nasal secretions for changes in amount and color  - [place] appropriate cooling/warming therapies per order  - Administer medications as ordered  - Instruct and encourage patient and family to use good hand hygiene technique  - Identify and instruct in appropriate isolation precautions for identified infection/condition  Outcome: Progressing     Problem: DISCHARGE PLANNING  Goal: Discharge to home or other facility with appropriate resources  Description: INTERVENTIONS:  - Identify barriers to discharge w/patient and caregiver  - Arrange for needed discharge resources and transportation as appropriate  - Identify discharge learning needs (meds, wound care, etc )  - Arrange for interpretive services to assist at discharge as needed  - Refer to Case Management Department for coordinating discharge planning if the patient needs post-hospital services based on physician/advanced practitioner order or complex needs related to functional status, cognitive ability, or social support system  Outcome: Progressing Problem: Knowledge Deficit  Goal: Patient/family/caregiver demonstrates understanding of disease process, treatment plan, medications, and discharge instructions  Description: Complete learning assessment and assess knowledge base    Interventions:  - Provide teaching at level of understanding  - Provide teaching via preferred learning methods  Outcome: Progressing

## 2022-05-17 NOTE — ASSESSMENT & PLAN NOTE
· POA with leukocytosis, tachycardia, febrile   Improving  · Source: UTI/pyelonephritis  · Lactate: normal x 2   · Lipase, LFTs wnl  · IVF: S/p 1L NS, c/w gentle IV fluid hydration  · IV abx: Day 3 Rocephin   · Urine cx: 50,000-59,000 cfu/ml Gram Negative Nikko Enteric Like  · BC x2:  No growth at 24 hours  · Trend WBCs/fever curve   · Follow up urine/blood culture

## 2022-05-17 NOTE — PLAN OF CARE
Problem: PAIN - ADULT  Goal: Verbalizes/displays adequate comfort level or baseline comfort level  Description: Interventions:  - Encourage patient to monitor pain and request assistance  - Assess pain using appropriate pain scale  - Administer analgesics based on type and severity of pain and evaluate response  - Implement non-pharmacological measures as appropriate and evaluate response  - Consider cultural and social influences on pain and pain management  - Notify physician/advanced practitioner if interventions unsuccessful or patient reports new pain  Outcome: Progressing     Problem: INFECTION - ADULT  Goal: Absence or prevention of progression during hospitalization  Description: INTERVENTIONS:  - Assess and monitor for signs and symptoms of infection  - Monitor lab/diagnostic results  - Monitor all insertion sites, i e  indwelling lines, tubes, and drains  - Monitor endotracheal if appropriate and nasal secretions for changes in amount and color  - [place] appropriate cooling/warming therapies per order  - Administer medications as ordered  - Instruct and encourage patient and family to use good hand hygiene technique  - Identify and instruct in appropriate isolation precautions for identified infection/condition  Outcome: Progressing  Goal: Absence of fever/infection during neutropenic period  Description: INTERVENTIONS:  - Monitor WBC  Outcome: Progressing     Problem: SAFETY ADULT  Goal: Patient will remain free of falls  Description: INTERVENTIONS:  - Educate patient/family on patient safety including physical limitations  - Instruct patient to call for assistance with activity   - Consult OT/PT to assist with strengthening/mobility   - Keep Call bell within reach  - Keep bed low and locked with side rails adjusted as appropriate  - Keep care items and personal belongings within reach  - Initiate and maintain comfort rounds  - Make Fall Risk Sign visible to staff  Outcome: Progressing  Goal: Maintain or return to baseline ADL function  Description: INTERVENTIONS:  -  Assess patient's ability to carry out ADLs; assess patient's baseline for ADL function and identify physical deficits which impact ability to perform ADLs (bathing, care of mouth/teeth, toileting, grooming, dressing, etc )  - Assess/evaluate cause of self-care deficits   - Assess range of motion  - Assess patient's mobility; develop plan if impaired  - Assess patient's need for assistive devices and provide as appropriate  - Encourage maximum independence but intervene and supervise when necessary  - Involve family in performance of ADLs  - Assess for home care needs following discharge   - Consider OT consult to assist with ADL evaluation and planning for discharge  - Provide patient education as appropriate  Outcome: Progressing  Goal: Maintains/Returns to pre admission functional level  Description: INTERVENTIONS:  - Perform BMAT or MOVE assessment daily    - Set and communicate daily mobility goal to care team and patient/family/caregiver     - Collaborate with rehabilitation services on mobility goals if consulted  - Out of bed for toileting  - Record patient progress and toleration of activity level   Outcome: Progressing     Problem: DISCHARGE PLANNING  Goal: Discharge to home or other facility with appropriate resources  Description: INTERVENTIONS:  - Identify barriers to discharge w/patient and caregiver  - Arrange for needed discharge resources and transportation as appropriate  - Identify discharge learning needs (meds, wound care, etc )  - Arrange for interpretive services to assist at discharge as needed  - Refer to Case Management Department for coordinating discharge planning if the patient needs post-hospital services based on physician/advanced practitioner order or complex needs related to functional status, cognitive ability, or social support system  Outcome: Progressing     Problem: Knowledge Deficit  Goal: Patient/family/caregiver demonstrates understanding of disease process, treatment plan, medications, and discharge instructions  Description: Complete learning assessment and assess knowledge base    Interventions:  - Provide teaching at level of understanding  - Provide teaching via preferred learning methods  Outcome: Progressing

## 2022-05-17 NOTE — ASSESSMENT & PLAN NOTE
· Na 129 on admission, improved to 135 today  · Likely hypovolemic due to decreased PO intake and vomiting   · Continue IVFs, decrease rate to [email or web address] for additional day  · Recheck in a m

## 2022-05-17 NOTE — ASSESSMENT & PLAN NOTE
· C/o of fever, nausea, flank pain with positive UA on admission  · No known history of complicated UTIs  · US kidney and bladder revealed bladder wall thickening, cannot exclude pyelonephritis  · RUQ US did not show any abnormalities of gallbladder, biliary tree   Possible inflammation noted on right kidney that could be associated with pyelonephritis  · See plan under sepsis

## 2022-05-17 NOTE — PROGRESS NOTES
Consultation - Gynecology   Raias Hernandez 28 y o  female MRN: [id number]  Unit/Bed#: -01 Encounter: [id number]    Assessment/Plan     Assessment:  29 yo female  Epigastric pain /RUQ pain   Early pregnant LMP 22  Prior 1 ,2 vtop  Pos trich treated aware partner need to be treated  Pyelo right on IV antibiotics   Plan:  Pelvic U/S dating and viability  Quant HCG serial  Gc//ct urine   continue antibiotics rocephin and pain medcs as per primary team  continue as plan for U/S RUQ pain and check pancreatitis, ulcer  ll f/w as outpatient and ll f/w U/S result   thanks so much for your consult    History of Present Illness   Physician Requesting Consult: Theron Zimmerman MD  Reason for Consult / Principal Problem: pregnant, epigastric pain , RUQ pain   Subspeciality: General GYN  HPI: Raisa Hernandez is a 28y o  year old female who presents with fever, epigastric pain , RUQ pain associated with N/V and fever on admission with SOB on admission   Also c/o urgency frequency  W/o dysuria  Denies any Vaginal bleeding or pelvic pain pain is controled with tylenol and  paion medcs  Afebrile since admission     Pregnant lmp 2022 pos pregnancy test        Consults    Review of Systems    Constitutional: Negative for diaphoresis  HENT: Negative for congestion  Respiratory: Positive for shortness of breath  on admission no SOB at the time of exam   Cardiovascular: Negative for chest pain, palpitations and leg swelling  Gastrointestinal: Positive for abdominal pain, nausea and vomiting  Pain in epigastric and RUQ, Negative fordiarrhea or constipation   Endocrine: Negative for polyuria  Genitourinary: Positive for flank pain, Negative for dysuria and hematuria  Skin: Negative for rash  Neurological: Negative for dizziness and headaches    Historical Information   No past medical history on file  No past surgical history on file    OB History    Para Term  AB Living   1 SAB IAB Ectopic Multiple Live Births                  # Outcome Date GA Lbr Zeb/2nd Weight Sex Delivery Anes PTL Lv   1 Current              No family history on file  Social History   Social History     Substance and Sexual Activity   Alcohol Use Yes    Alcohol/week: 1 0 standard drink    Types: 1 Shots of liquor per week     Social History     Substance and Sexual Activity   Drug Use Yes    Types: Marijuana    Comment: daily     E-Cigarette/Vaping    E-Cigarette Use Never User      E-Cigarette/Vaping Substances     Social History     Tobacco Use   Smoking Status Never Smoker   Smokeless Tobacco Not on file       Meds/Allergies   all current active meds have been reviewed    No Known Allergies    Objective   Vitals: Blood pressure 118/79, pulse 88, temperature 98 1 °F (36 7 °C), temperature source Tympanic, resp  rate 16, height 5' 7" (1 702 m), weight 65 kg (143 lb 3 2 oz), last menstrual period 04/07/2022, SpO2 98 %, not currently breastfeeding  Body mass index is 22 43 kg/m²  No intake or output data in the 24 hours ending 05/17/22 0443    Invasive Devices  Report    Peripheral Intravenous Line  Duration           Peripheral IV 05/15/22 Right Antecubital 1 day                Physical Exam    AAOX3, no acute distress  HEENT wnl   LUNGS CTAB/L  Back mild Right flank pain radiate to the RUQ  ABD soft epigastric pain , mild RUQ apin no acute abd no lower pelvic pain   Ext no edema no calf tenderness      Lab Results: I have personally reviewed pertinent reports  Wbc improving, urine culture still pending  Imaging Studies: I have personally reviewed pertinent reports  Code Status: Level 1 - Full Code    Counseling / Coordination of Care  Total floor / unit time spent today 25 minutes  Greater than 50% of total time was spent with the patient and / or family counseling and / or coordination of care

## 2022-05-17 NOTE — UTILIZATION REVIEW
Initial Clinical Review    Admission: Date/Time/Statement:   Admission Orders (From admission, onward)     Ordered        22 0031  Place in Observation  Once                      Orders Placed This Encounter   Procedures    Place in Observation     Standing Status:   Standing     Number of Occurrences:   1     Order Specific Question:   Level of Care     Answer:   Med Surg [16]     ED Arrival Information     Expected   -    Arrival   5/15/2022 20:51    Acuity   Urgent            Means of arrival   Ambulance    Escorted by   [facility]    Service   Hospitalist    Admission type   Urgent            Arrival complaint   -           Chief Complaint   Patient presents with    Abdominal Pain     Pt is 5 weeks preganant c/o generalized abdominal pain since Thursday associated witn nausea/vomiting and fever, denies vag bleeding       Initial Presentation: 28 y o  female W/PMHX: less than 8wks preg c/w prenatal care  Trichomonas vaginalis infection treated in ED, to ED from home via ems, admitted as Observation, due to Pyelonephritis 2/2 +UA, sepsis  Presented with c/o fever, nausea, flank pain with +UA, Exam: tachycardia, abdominal tenderness, RT  CVA tenderness and LT CVA tenderness, ED work up reveals: Leukocytosis, tachycardia, febrile, 1L IVF bolus c/w @100ml/hr, IV ABX, lactic acid 1 6, hypokalemia 3 3, hyponatremia 129, 2/2 decreased po intake and n/v, Plan: C/w IV abx, trend serial labs with repletion as needed, trend fever curve, DVT ppx, US pending, GYN consult  Date: 22 : UTI/Pyelonephritits, lipase, LFT's wnl  c/w IVF hydration, UA C/S 50,0000- cfu/ml gram  Neg billy enteric like  D #3 IV Rocephin, BXC no growth x 24H, U/S dating and viability pending  GYN Consult: Assessment: epigastric pain/RUQ pain, early pregnant LMP 22, Prior , 2VTOP, +trich treated in ED, pyelo on rt   IV ABX, Plan: pelvic u/s dating and viability, quant HCG serial, GC/CT urine, pain meds, eval for pancreatitis, and ulcer, f/u outpt ,     Infectious Disease: Sepsis POA fever, tachycardia, leukocytosis, lactate wnl, presumed Urinary source of sepsis with consideration for complicated UTI, RT pyelonephritis, UA C/S growth of GNRs, RUQ US demonstrates mild heterogeneity of the right renal cortex suspicious for inflammation  Renal US demonstrates bladder wall thickening  F/u on BC, c/w IV ABX, trend serial labs with repletion as needed, trend clinical s/s, trend fever curve, trichomoniasis treated with metronidazole 2g in ED, f/u UA chlamydia  hcg +, prenatal care for further testing  Internal medicine note:   Quantitative hCG today : 5,246  Patient estimated to be approximately 5 weeks pregnant  US results too early to determine dating/viability: "Differential remains early IUP, spontaneous  and ectopic pregnancy " Repeat quantitative hCG q2D, f/u ob/gyn outpt, on   Repeat pelvic us in 1 wk        ED Triage Vitals [05/15/22 2057]   Temperature Pulse Respirations Blood Pressure SpO2   (!) 101 2 °F (38 4 °C) 104 17 141/94 100 %      Temp Source Heart Rate Source Patient Position - Orthostatic VS BP Location FiO2 (%)   Oral Monitor Lying Right arm --      Pain Score       7          Wt Readings from Last 1 Encounters:   22 65 kg (143 lb 3 2 oz)     Additional Vital Signs:   Date/Time Temp Pulse Resp BP MAP (mmHg) SpO2 O2 Device Patient Position - Orthostatic VS   22 0723 99 3 °F (37 4 °C) 73 20 113/63 -- 100 % -- Lying   22 2230 98 1 °F (36 7 °C) 88 16 118/79 93 98 % None (Room air) Lying   22 1748 99 4 °F (37 4 °C) 86 16 137/102 Abnormal  115 100 % None (Room air) Lying   22 1448 96 5 °F (35 8 °C) Abnormal  88 16 116/65 -- 100 % -- Lying   22 0925 -- -- -- -- -- -- None (Room air) --   22 0739 99 3 °F (37 4 °C) 85 20 111/72 -- 99 % -- Lying   22 0300 -- -- -- -- -- -- None (Room air) --   22 0121 97 9 °F (36 6 °C) 78 18 115/69 -- 100 % None (Room air) --   05/15/22 2301 98 8 °F (37 1 °C) 89 17 113/61 -- 100 % None (Room air) Lying   05/15/22 2227 98 5 °F (36 9 °C) -- -- -- -- -- -- --       Pertinent Labs/Diagnostic Test Results:   US right upper quadrant   Final Result by Janes Diamond MD ([id number])      1  Normal-appearing gallbladder and biliary tree  2   Patient is being treated for pyelonephritis based on clinical assessment and while ultrasound cannot confidently confirm or exclude this diagnosis, there is mild heterogeneity of the cortex which could represent inflammation  Workstation performed: [id number]         US kidney and bladder   Final Result by Janes Diamond MD ([id number])      1  Normal-appearing kidneys though this does not exclude the presence of acute pyelonephritis      2  Bladder wall thickening likely related to UTI  Workstation performed: [id number]         US pelvis complete w transvaginal  No intrauterine gestation or adnexal mass identified  Anechoic focus in the endometrium is either an early gestational sac or pseudosac  Pseudosac is favored given central location  Differential remains early IUP, spontaneous  and ectopic   pregnancy  Correlate with serial quantitative BHCG      2  Septated right ovarian cyst  Based on the ACR O-RADS system, this is O-RADS category 3 (low-risk with 1% to <10% risk of malignancy ) The management recommendation is management by gynecologist      3   Mild to moderate free fluid      4   Retroverted uterus       Results from last 7 days   Lab Units 05/15/22  2116   SARS-COV-2  Negative     Results from last 7 days   Lab Units 22  0508 22  2037 22  0430 05/15/22  211   WBC Thousand/uL 13 78* 16 29* 20 00* 25 09*   HEMOGLOBIN g/dL 9 7* 10 3* 10 6* 11 2*   HEMATOCRIT % 28 9* 30 6* 31 6* 32 7*   PLATELETS Thousands/uL 280 289 255 262   NEUTROS ABS Thousands/µL  --   --  15 37*  --          Results from last 7 days   Lab Units 05/17/22  0508 05/16/22 2037 05/16/22 0430 05/15/22  2116   SODIUM mmol/L 135 132* 135 129*   POTASSIUM mmol/L 4 1 3 8 3 9 3 3*   CHLORIDE mmol/L 103 103 102 95*   CO2 mmol/L 22 21 22 19*   ANION GAP mmol/L 10 8 11 15*   BUN mg/dL 2* 4* 4* 4*   CREATININE mg/dL 0 72 0 65 0 66 0 85   EGFR ml/min/1 73sq m 114 121 120 93   CALCIUM mg/dL 9 0 9 5 9 5 10 1     Results from last 7 days   Lab Units 05/17/22  0508 05/15/22  2116   AST U/L 7* 9*   ALT U/L 5* 9   ALK PHOS U/L 39 48   TOTAL PROTEIN g/dL 6 7 8 5*   ALBUMIN g/dL 3 8 4 6   TOTAL BILIRUBIN mg/dL 0 38 0 76         Results from last 7 days   Lab Units 05/17/22  0508 05/16/22 2037 05/16/22 0430 05/15/22  2116   GLUCOSE RANDOM mg/dL 82 104 97 113         Results from last 7 days   Lab Units 05/16/22  2037 05/15/22  2116   LACTIC ACID mmol/L 0 7 1 6           Results from last 7 days   Lab Units 05/16/22 2037 05/16/22  0430   LIPASE u/L 8* <6*     Results from last 7 days   Lab Units 05/16/22 0430   CRP mg/L 138 4*             Results from last 7 days   Lab Units 05/15/22  2234   CLARITY UA  Slightly Cloudy*   COLOR UA  Yellow   SPEC GRAV UA  1 010   PH UA  7 5   GLUCOSE UA mg/dl Negative   KETONES UA mg/dl 40 (2+)*   BLOOD UA  2+*   PROTEIN UA mg/dl Trace*   NITRITE UA  Negative   BILIRUBIN UA  Negative   UROBILINOGEN UA E U /dl 0 2   LEUKOCYTES UA  3+*   WBC UA /hpf 20-30*   RBC UA /hpf 4-10*   BACTERIA UA /hpf Occasional   EPITHELIAL CELLS WET PREP /hpf Moderate*     Results from last 7 days   Lab Units 05/15/22  2116   INFLUENZA A PCR  Negative   INFLUENZA B PCR  Negative   RSV PCR  Negative                             Results from last 7 days   Lab Units 05/15/22  2234 05/15/22  2123 05/15/22  2116   BLOOD CULTURE   --  No Growth at 24 hrs  No Growth at 24 hrs     URINE CULTURE  50,000-59,000 cfu/ml Gram Negative Nikko Enteric Like*  --   --                ED Treatment:   Medication Administration from 05/15/2022 2051 to 05/16/2022 0114       Date/Time Order Dose Route Action     05/15/2022 2123 acetaminophen (TYLENOL) tablet 650 mg 650 mg Oral Given     05/15/2022 2206 sodium chloride 0 9 % bolus 1,000 mL 1,000 mL Intravenous New Bag     05/15/2022 2340 cefTRIAXone (ROCEPHIN) IVPB (premix in dextrose) 1,000 mg 50 mL 1,000 mg Intravenous New Bag     05/16/2022 0050 metroNIDAZOLE (FLAGYL) tablet 2,000 mg 2,000 mg Oral Given          Admitting Diagnosis: Abdominal pain [R10 9]  Pyelonephritis [N12]  Age/Sex: 28 y o  female  Admission Orders:activity as tolerated, oob, I/O,   Scheduled Medications:  cefTRIAXone, 1,000 mg, Intravenous, Q24H      Continuous IV Infusions:  sodium chloride, 100 mL/hr, Intravenous, Continuous      PRN Meds:  acetaminophen, 650 mg, Oral, Q6H PRN 5/16 x3  doxylamine, 12 5 mg, Oral, Daily PRN  morphine injection, 2 mg, Intravenous, Q6H PRN 5/16 x1  pyridoxine, 50 mg, Oral, Daily PRN        IP CONSULT TO OB GYN  IP CONSULT TO INFECTIOUS DISEASES    Network Utilization Review Department  ATTENTION: Please call with any questions or concerns to [phone] and carefully listen to the prompts so that you are directed to the right person  All voicemails are confidential   Ezio Coffey all requests for admission clinical reviews, approved or denied determinations and any other requests to dedicated fax number below belonging to the campus where the patient is receiving treatment   List of dedicated fax numbers for the Facilities:  [address] DENIALS (Administrative/Medical Necessity) [phone]   [address] [place] [address] (Maternity/NICU/Pediatrics) 261 [facility] Floor [facility] [address] [place] [address]  [address] [facility] [place] [address] 1277 [facility] [address] [place] [address] Porfirio Patel 1481 [phone] [phone]

## 2022-05-17 NOTE — ASSESSMENT & PLAN NOTE
· Approximately 5-6 weeks pregnant  · ED physician discussed with obstetrics who stated patient is appropriate for observation at [facility] for further IV abx  · OBGYN consulted:  · Pelvic US dating and viability scheduled for this afternoon  · Check quantitative hCG serial, GC/C urine  · Will need close outpatient follow-up with OBGYN  · Continue prenatal care

## 2022-05-17 NOTE — DISCHARGE INSTR - AVS FIRST PAGE
You were treated for a urinary tract infection/UTI, as discussed there was concern that the UTI extended to your kidneys, requiring treatment with IV antibiotics  Infectious Disease had seen you, you can continue taking oral antibiotics on discharge with cefadroxil 500mg by mouth twice a day for 4 more days to complete on 5/21/22  As discussed, it is too early to determine the exact status of your current pregnancy based off your blood test and pelvic ultrasound results so far  You will need close follow-up outpatient with OBGYN, you can follow-up with Dr Kaitlin Jarvis who evaluated you in-hospital   You will need to be seen ASAP by OBGYN on Monday, May 23rd or Tuesday, May 24th at the latest    You will need to get blood work done outpatient for quantitative hCG levels every 2 days, first time on Thursday May 19th, and again 2 days later on Saturday May 21st  Your OBGYN also ordered an additional blood test for STDs, which is part of the routine workup  Most likely you would probably need another pelvic ultrasound outpatient in a week or so depending on your blood test results, to be determined by your OBGYN  If you do not currently have a primary care provider or family medicine doctor, recommend you establish care for general medical care, will give referral to follow up with Internal Medicine outpatient

## 2022-05-18 VITALS
DIASTOLIC BLOOD PRESSURE: 64 MMHG | RESPIRATION RATE: 16 BRPM | WEIGHT: 143.2 LBS | OXYGEN SATURATION: 100 % | BODY MASS INDEX: 22.47 KG/M2 | HEART RATE: 60 BPM | TEMPERATURE: 97.6 F | HEIGHT: 67 IN | SYSTOLIC BLOOD PRESSURE: 111 MMHG

## 2022-05-18 PROBLEM — E87.6 HYPOKALEMIA: Status: RESOLVED | Noted: 2022-05-16 | Resolved: 2022-05-18

## 2022-05-18 LAB
ALBUMIN SERPL BCP-MCNC: 3.5 G/DL (ref 3.5–5)
ALP SERPL-CCNC: 39 U/L (ref 34–104)
ALT SERPL W P-5'-P-CCNC: 7 U/L (ref 7–52)
ANION GAP SERPL CALCULATED.3IONS-SCNC: 9 MMOL/L (ref 4–13)
AST SERPL W P-5'-P-CCNC: 10 U/L (ref 13–39)
BACTERIA UR CULT: ABNORMAL
BASOPHILS # BLD AUTO: 0.04 THOUSANDS/ΜL (ref 0–0.1)
BASOPHILS NFR BLD AUTO: 0 % (ref 0–1)
BILIRUB SERPL-MCNC: 0.33 MG/DL (ref 0.2–1)
BUN SERPL-MCNC: 3 MG/DL (ref 5–25)
CALCIUM SERPL-MCNC: 8.8 MG/DL (ref 8.4–10.2)
CHLORIDE SERPL-SCNC: 104 MMOL/L (ref 96–108)
CO2 SERPL-SCNC: 20 MMOL/L (ref 21–32)
CREAT SERPL-MCNC: 0.67 MG/DL (ref 0.6–1.3)
EOSINOPHIL # BLD AUTO: 0.23 THOUSAND/ΜL (ref 0–0.61)
EOSINOPHIL NFR BLD AUTO: 2 % (ref 0–6)
ERYTHROCYTE [DISTWIDTH] IN BLOOD BY AUTOMATED COUNT: 13.9 % (ref 11.6–15.1)
GFR SERPL CREATININE-BSD FRML MDRD: 120 ML/MIN/1.73SQ M
GLUCOSE SERPL-MCNC: 70 MG/DL (ref 65–140)
HCT VFR BLD AUTO: 27.6 % (ref 34.8–46.1)
HGB BLD-MCNC: 8.9 G/DL (ref 11.5–15.4)
IMM GRANULOCYTES # BLD AUTO: 0.07 THOUSAND/UL (ref 0–0.2)
IMM GRANULOCYTES NFR BLD AUTO: 1 % (ref 0–2)
LYMPHOCYTES # BLD AUTO: 2.1 THOUSANDS/ΜL (ref 0.6–4.47)
LYMPHOCYTES NFR BLD AUTO: 20 % (ref 14–44)
MCH RBC QN AUTO: 22.8 PG (ref 26.8–34.3)
MCHC RBC AUTO-ENTMCNC: 32.2 G/DL (ref 31.4–37.4)
MCV RBC AUTO: 71 FL (ref 82–98)
MONOCYTES # BLD AUTO: 0.85 THOUSAND/ΜL (ref 0.17–1.22)
MONOCYTES NFR BLD AUTO: 8 % (ref 4–12)
NEUTROPHILS # BLD AUTO: 7.22 THOUSANDS/ΜL (ref 1.85–7.62)
NEUTS SEG NFR BLD AUTO: 69 % (ref 43–75)
NRBC BLD AUTO-RTO: 0 /100 WBCS
PLATELET # BLD AUTO: 276 THOUSANDS/UL (ref 149–390)
PMV BLD AUTO: 11.2 FL (ref 8.9–12.7)
POTASSIUM SERPL-SCNC: 3.9 MMOL/L (ref 3.5–5.3)
PROT SERPL-MCNC: 6.4 G/DL (ref 6.4–8.4)
RBC # BLD AUTO: 3.91 MILLION/UL (ref 3.81–5.12)
SODIUM SERPL-SCNC: 133 MMOL/L (ref 135–147)
WBC # BLD AUTO: 10.51 THOUSAND/UL (ref 4.31–10.16)

## 2022-05-18 PROCEDURE — 99217 PR OBSERVATION CARE DISCHARGE MANAGEMENT: CPT | Performed by: PHYSICIAN ASSISTANT

## 2022-05-18 PROCEDURE — 85025 COMPLETE CBC W/AUTO DIFF WBC: CPT | Performed by: PHYSICIAN ASSISTANT

## 2022-05-18 PROCEDURE — 99214 OFFICE O/P EST MOD 30 MIN: CPT | Performed by: PHYSICIAN ASSISTANT

## 2022-05-18 PROCEDURE — 80053 COMPREHEN METABOLIC PANEL: CPT | Performed by: PHYSICIAN ASSISTANT

## 2022-05-18 RX ORDER — CEFADROXIL 500 MG/1
500 CAPSULE ORAL EVERY 12 HOURS SCHEDULED
Qty: 8 CAPSULE | Refills: 0 | Status: SHIPPED | OUTPATIENT
Start: 2022-05-18 | End: 2022-05-22

## 2022-05-18 RX ORDER — ACETAMINOPHEN 325 MG/1
650 TABLET ORAL EVERY 6 HOURS PRN
Qty: 20 TABLET | Refills: 0 | Status: SHIPPED | OUTPATIENT
Start: 2022-05-18 | End: 2022-07-08 | Stop reason: SDUPTHER

## 2022-05-18 RX ORDER — PYRIDOXINE HCL (VITAMIN B6) 50 MG
50 TABLET ORAL DAILY PRN
Qty: 20 TABLET | Refills: 0 | Status: SHIPPED | OUTPATIENT
Start: 2022-05-18

## 2022-05-18 NOTE — ASSESSMENT & PLAN NOTE
· Approximately 5 weeks pregnant  · ED physician discussed with obstetrics who stated patient is appropriate for observation at [facility] for further IV abx  · Quantitative hCG : 5,246  · Pelvic US : No intrauterine gestation or adnexal mass identified  Anechoic focus in the endometrium is either an early gestational sac or pseudosac  Pseudosac is favored given central location  Differential remains early IUP, spontaneous  and ectopic pregnancy  Correlate with serial quantitative BHCG    · OBGYN consulted, per Dr Dominguez Devang:  · Too early to distinguish if ultrasound findings are definitively ectopic verses early IUP, no indications for further inpatient interventions/transfer  · Patient's symptoms are significantly improved, remained hemodynamically stable, low suspicion for ectopic pregnancy  · Patient will need to have outpatient serial quantitative hCGs q2 days, on , and again on   Also will obtain outpatient GC/Cl PCR, ordered by OBGYN  · Patient will need to follow-up with OBGYN outpatient ASAP on , latest   · Patient will likely need to have another repeat pelvic ultrasound in 1 week, to be ordered by [facility] outpatient      · Will need close outpatient follow-up with OBGYN  · Continue prenatal care

## 2022-05-18 NOTE — DISCHARGE SUMMARY
[address]  Discharge- Theoplis Tommy 1995, 28 y o  female MRN: [id number]  Unit/Bed#: -Lee Encounter: [id number]  Primary Care Provider: No primary care provider on file  Date and time admitted to hospital: 5/15/2022  8:51 PM    Sepsis [facility])  Assessment & Plan  · POA with leukocytosis, tachycardia, febrile  Improved  · Source: UTI/pyelonephritis  · Lactate: normal x 2   · Lipase, LFTs wnl  · IVF: S/p 1L NS, c/w gentle IV fluid hydration  · Abx: Received 3 days IV Rocephin  · Urine cx: 50,000-59,000 cfu/ml E  Coli, susceptible to cefazolin  · BC x2: No growth at 48 hours  · Trend WBCs/fever curve   · Follow up urine/blood culture     * Pyelonephritis  Assessment & Plan  · C/o of fever, nausea, flank pain with positive UA on admission  · No known history of complicated UTIs  · US kidney and bladder revealed bladder wall thickening, cannot exclude pyelonephritis  · RUQ US did not show any abnormalities of gallbladder, biliary tree  Possible inflammation noted on right kidney that could be associated with pyelonephritis  · ID consulted given pregnancy status:  · Can discharge with cefadroxil 500 mg PO q12hrs x 4 days through til 22 to complete total 1 week course  · See plan under sepsis    Less than 8 weeks gestation of pregnancy  Assessment & Plan  · Approximately 5 weeks pregnant  · ED physician discussed with obstetrics who stated patient is appropriate for observation at [facility] for further IV abx  · Quantitative hCG : 5,246  · Pelvic US : No intrauterine gestation or adnexal mass identified  Anechoic focus in the endometrium is either an early gestational sac or pseudosac  Pseudosac is favored given central location  Differential remains early IUP, spontaneous  and ectopic pregnancy   Correlate with serial quantitative BHCG    · OBGYN consulted, per Dr Cristal Robles:  · Too early to distinguish if ultrasound findings are definitively ectopic verses early IUP, no indications for further inpatient interventions/transfer  · Patient's symptoms are significantly improved, remained hemodynamically stable, low suspicion for ectopic pregnancy  · Patient will need to have outpatient serial quantitative hCGs q2 days, on Thursday 5/19, and again on Saturday 5/21  Also will obtain outpatient GC/Cl PCR, ordered by OBGYN  · Patient will need to follow-up with OBGYN outpatient ASAP on Monday 5/23, latest Tuesday 5/24  · Patient will likely need to have another repeat pelvic ultrasound in 1 week, to be ordered by [facility] outpatient  · Will need close outpatient follow-up with OBGYN  · Continue prenatal care     Hyponatremia  Assessment & Plan  · Na 129 on admission, flucuated by overall improved with IVF hydration  · Likely hypovolemic due to decreased PO intake and vomiting     Trichomonas vaginalis infection  Assessment & Plan  · Treated with 2g metronidazole in ED  · Previous provider advised for patient's partner treatment and abstinence from sexual intercourse for 7d post treatment     Hypokalemia-resolved as of 5/18/2022  Assessment & Plan  · Mild hypokalemia on admission, has since resolved  · Monitor and replete PRN      Medical Problems             Resolved Problems  Date Reviewed: 5/18/2022          Resolved    Hypokalemia 5/18/2022     Resolved by  Bib Arreola PA-C              Discharging Physician / Practitioner: Bib Arreola PA-C  PCP: No primary care provider on file  Admission Date:   Admission Orders (From admission, onward)     Ordered        05/16/22 0031  Place in Observation  Once                      Discharge Date: 05/18/22    Consultations During Hospital Stay:  · Infectious disease  · OBGYN    Procedures Performed:   · None    Significant Findings / Test Results:   US OB pregnancy limited with transvaginal   Final Result by Geronimo Little MD (05/17 6073)      1  No intrauterine gestation or adnexal mass identified   Anechoic focus in the endometrium is either an early gestational sac or pseudosac  Pseudosac is favored given central location  Differential remains early IUP, spontaneous  and ectopic    pregnancy  Correlate with serial quantitative BHCG  2   Septated right ovarian cyst  Based on the ACR O-RADS system, this is O-RADS category 3 (low-risk with 1% to <10% risk of malignancy ) The management recommendation is management by gynecologist       3   Mild to moderate free fluid  4   Retroverted uterus  The study was marked in [facility] for immediate notification  Workstation performed: [id number]         US right upper quadrant   Final Result by Kj Carlin MD ( [id number])      1  Normal-appearing gallbladder and biliary tree  2   Patient is being treated for pyelonephritis based on clinical assessment and while ultrasound cannot confidently confirm or exclude this diagnosis, there is mild heterogeneity of the cortex which could represent inflammation  Workstation performed: [id number]         US kidney and bladder   Final Result by Kj Carlin MD ( [id number])      1  Normal-appearing kidneys though this does not exclude the presence of acute pyelonephritis      2  Bladder wall thickening likely related to UTI  Workstation performed: [id number]             Incidental Findings:   · None     Test Results Pending at Discharge (will require follow up):    · Final blood culture results     Outpatient Tests Requested:  · Follow-up with OBGYN outpatient ASAP, to have outpatient blood work done including gonorrhea/chlamydia PCR, serial quantitative hCG on  and   · Establish care with PCP, given referral to follow-up with internal medicine outpatient    Complications: None    Reason for Admission:  Pyelonephritis    Hospital Course:   Shyanne Whitten is a 28 y o  female patient who originally presented to the hospital on 5/15/2022 due to presumed right pyelonephritis, presented with flank pain and nausea/vomiting  Patient's history complicated by current early pregnancy state, approximately 5 weeks pregnant  On arrival to ED, patient met sepsis criteria, no lactic acidosis, UA consistent with UTI  US kidney and bladder revealed bladder wall thickening, although pyelonephritis could not be excluded  RUQ US revealed possible inflammation on right kidney associated with pyelonephritis, no gallbladder or biliary abnormalities  Hyponatremia POA, likely due to volume depletion  Patient was started on IV fluids, IV ceftriaxone, Infectious Disease and OBGYN consulted given current pregnancy, deemed appropriate for SLE admission for monitoring and antibiotic treatment  Throughout course of admission, patient clinically improved, hyponatremia stabilized, cleared for transition to cefadroxil to complete total of 1 week antibiotic course per ID  OBGYN evaluated patient, pelvic ultrasound on  revealed a possible gestational sac versus pseudosac, too early to differentiate between early IUP, spontaneous  or ectopic pregnancy, low suspicion for ectopic pregnancy per OBGYN as patient remained hemodynamically stable and clinically improved  Patient will need close outpatient follow-up, obtain serial quantitative hCGs q2days and gonorrhea/chlamydia PCR testing outpatient, will need to follow-up with OBGYN outpatient ASAP on , educated and discussed extensively with patient  Patient given referral to follow-up with OBGYN Dr Michaelle Trinidad who evaluated inpatient  Given referral to establish care with PCP  Please see above list of diagnoses and related plan for additional information  Condition at Discharge: stable    Discharge Day Visit / Exam:   Subjective:  Patient seen bedside this a m , reports her abdominal pain is completely resolved, no other acute complaints at this time, states she feels ready for discharge home    Vitals: Blood Pressure: 111/64 (22 0813)  Pulse: 60 (05/18/22 0813)  Temperature: 97 6 °F (36 4 °C) (05/18/22 0813)  Temp Source: Tympanic (05/18/22 0813)  Respirations: 16 (05/18/22 0813)  Height: 5' 7" (170 2 cm) (05/15/22 2057)  Weight - Scale: 65 kg (143 lb 3 2 oz) (05/16/22 0634)  SpO2: 100 % (05/18/22 0813)  Exam:   Physical Exam  Constitutional:       General: She is not in acute distress  Appearance: Normal appearance  She is not ill-appearing, toxic-appearing or diaphoretic  Cardiovascular:      Rate and Rhythm: Normal rate and regular rhythm  Pulses: Normal pulses  Heart sounds: Normal heart sounds  Pulmonary:      Effort: Pulmonary effort is normal  No respiratory distress  Abdominal:      General: Bowel sounds are normal  There is no distension  Palpations: Abdomen is soft  Tenderness: There is no abdominal tenderness  Musculoskeletal:         General: No swelling or tenderness  Skin:     General: Skin is warm  Neurological:      General: No focal deficit present  Mental Status: She is alert  Psychiatric:         Mood and Affect: Mood normal          Behavior: Behavior normal          Discussion with Family: Patient declined call to   Discharge instructions/Information to patient and family:   See after visit summary for information provided to patient and family  Provisions for Follow-Up Care:  See after visit summary for information related to follow-up care and any pertinent home health orders  Disposition:   Home    Planned Readmission: None     Discharge Statement:  I spent 45 minutes discharging the patient  This time was spent on the day of discharge  I had direct contact with the patient on the day of discharge  Greater than 50% of the total time was spent examining patient, answering all patient questions, arranging and discussing plan of care with patient as well as directly providing post-discharge instructions    Additional time then spent on discharge activities  Discharge Medications:  See after visit summary for reconciled discharge medications provided to patient and/or family        **Please Note: This note may have been constructed using a voice recognition system**

## 2022-05-18 NOTE — ASSESSMENT & PLAN NOTE
· Treated with 2g metronidazole in ED  · Previous provider advised for patient's partner treatment and abstinence from sexual intercourse for 7d post treatment

## 2022-05-18 NOTE — PLAN OF CARE
Problem: PAIN - ADULT  Goal: Verbalizes/displays adequate comfort level or baseline comfort level  Description: Interventions:  - Encourage patient to monitor pain and request assistance  - Assess pain using appropriate pain scale  - Administer analgesics based on type and severity of pain and evaluate response  - Implement non-pharmacological measures as appropriate and evaluate response  - Consider cultural and social influences on pain and pain management  - Notify physician/advanced practitioner if interventions unsuccessful or patient reports new pain  Outcome: Progressing     Problem: INFECTION - ADULT  Goal: Absence or prevention of progression during hospitalization  Description: INTERVENTIONS:  - Assess and monitor for signs and symptoms of infection  - Monitor lab/diagnostic results  - Monitor all insertion sites, i e  indwelling lines, tubes, and drains  - Monitor endotracheal if appropriate and nasal secretions for changes in amount and color  - [place] appropriate cooling/warming therapies per order  - Administer medications as ordered  - Instruct and encourage patient and family to use good hand hygiene technique  - Identify and instruct in appropriate isolation precautions for identified infection/condition  Outcome: Progressing     Problem: DISCHARGE PLANNING  Goal: Discharge to home or other facility with appropriate resources  Description: INTERVENTIONS:  - Identify barriers to discharge w/patient and caregiver  - Arrange for needed discharge resources and transportation as appropriate  - Identify discharge learning needs (meds, wound care, etc )  - Arrange for interpretive services to assist at discharge as needed  - Refer to Case Management Department for coordinating discharge planning if the patient needs post-hospital services based on physician/advanced practitioner order or complex needs related to functional status, cognitive ability, or social support system  Outcome: Progressing Problem: Knowledge Deficit  Goal: Patient/family/caregiver demonstrates understanding of disease process, treatment plan, medications, and discharge instructions  Description: Complete learning assessment and assess knowledge base    Interventions:  - Provide teaching at level of understanding  - Provide teaching via preferred learning methods  Outcome: Progressing

## 2022-05-18 NOTE — ASSESSMENT & PLAN NOTE
· Na 129 on admission, flucuated by overall improved with IVF hydration  · Likely hypovolemic due to decreased PO intake and vomiting

## 2022-05-18 NOTE — ASSESSMENT & PLAN NOTE
· C/o of fever, nausea, flank pain with positive UA on admission  · No known history of complicated UTIs  · US kidney and bladder revealed bladder wall thickening, cannot exclude pyelonephritis  · RUQ US did not show any abnormalities of gallbladder, biliary tree   Possible inflammation noted on right kidney that could be associated with pyelonephritis  · ID consulted given pregnancy status:  · Can discharge with cefadroxil 500 mg PO q12hrs x 4 days through til 5/21/22 to complete total 1 week course  · See plan under sepsis

## 2022-05-18 NOTE — ASSESSMENT & PLAN NOTE
· POA with leukocytosis, tachycardia, febrile  Improved  · Source: UTI/pyelonephritis  · Lactate: normal x 2   · Lipase, LFTs wnl  · IVF: S/p 1L NS, c/w gentle IV fluid hydration  · Abx: Received 3 days IV Rocephin  · Urine cx: 50,000-59,000 cfu/ml E   Coli, susceptible to cefazolin  · BC x2: No growth at 48 hours  · Trend WBCs/fever curve   · Follow up urine/blood culture

## 2022-05-18 NOTE — CASE MANAGEMENT
Case Management Assessment & Discharge Planning Note    Patient name Ismael Mederos  Location [address] 350/-[id number] MRN [id number]  : 1995 Date 2022       Current Admission Date: 5/15/2022  Current Admission Diagnosis:Pyelonephritis   Patient Active Problem List    Diagnosis Date Noted    Hyponatremia 2022    Pyelonephritis 2022    Trichomonas vaginalis infection 2022    Sepsis ([address] ) 2022    Less than 8 weeks gestation of pregnancy 2022      LOS (days): 0  Geometric Mean LOS (GMLOS) (days):   Days to GMLOS:     OBJECTIVE:              Current admission status: Observation  Referral Reason: OB/Child    Preferred Pharmacy:   [facility], [address] [place] [address],[address]  [address] [place] [id number]  Phone: [phone] Fax: [phone]    Primary Care Provider: No primary care provider on file      Primary Insurance: ELENA ESPINOZA PENDING  Secondary Insurance:     ASSESSMENT:  Readmission Root Cause  30 Day Readmission: No    Patient Information  Admitted from[de-identified] Home  Mental Status: Alert  During Assessment patient was accompanied by: Not accompanied during assessment  Assessment information provided by[de-identified] Patient  Primary Caregiver: Self  Support Systems: Self, Spouse/significant other, Family members  [place] of Residence: [address] [place] [address] do you live in?: [place]  Type of Current Residence: Apartment  Upon entering residence, is there a bedroom on the main floor (no further steps)?: Yes  Upon entering residence, is there a bathroom on the main floor (no further steps)?: Yes  In the last 12 months, was there a time when you were not able to pay the mortgage or rent on time?: No  In the last 12 months, how many places have you lived?: 1  In the last 12 months, was there a time when you did not have a steady place to sleep or slept in a shelter (including now)?: No  Homeless/housing insecurity resource given?: No  Living Arrangements: Lives w/ Extended Family  Is patient a ?: No    Activities of Daily Living Prior to Admission  Functional Status: Independent  Completes ADLs independently?: Yes  Ambulates independently?: Yes  Does patient use assisted devices?: No  Does patient currently own DME?: No  Does patient have a history of Outpatient Therapy (PT/OT)?: No  Does the patient have a history of Short-Term Rehab?: No  Does patient have a history of HHC?: No  Does patient currently have [place] [address]?: No    Patient Information Continued  Income Source: Employed  Does patient have prescription coverage?: No  Within the past 12 months, you worried that your food would run out before you got the money to buy more : Never true  Within the past 12 months, the food you bought just didn't last and you didn't have money to get more : Never true  Food insecurity resource given?: No  Does patient receive dialysis treatments?: No  Does patient have a history of substance abuse?: No  Does patient have a history of Mental Health Diagnosis?: No    PHQ 2/9 Screening   Reviewed PHQ 2/9 Depression Screening Score?: No    Means of Transportation  Means of Transport to Appts[de-identified] Drives Self  In the past 12 months, has lack of transportation kept you from medical appointments or from getting medications?: No  In the past 12 months, has lack of transportation kept you from meetings, work, or from getting things needed for daily living?: No  Was application for public transport provided?: No    DISCHARGE DETAILS:    Discharge planning discussed with[de-identified] Patient  Freedom of Choice: Yes     CM contacted family/caregiver?: No- see comments (DECLINED)  Were Treatment Team discharge recommendations reviewed with patient/caregiver?: Yes  Did patient/caregiver verbalize understanding of patient care needs?: Yes  Were patient/caregiver advised of the risks associated with not following Treatment Team discharge recommendations?: Yes    Requested [address]         [facility]?: No    DME Referral Provided  Referral made for DME?: No    Other Referral/Resources/Interventions Provided:  Financial Resources Provided: Financial Counselor    Would you like to participate in our [address] service program?  : No - Declined    Treatment Team Recommendation: Home  Discharge Destination Plan[de-identified] Home  Transport at Discharge : Family     Additional Comments: CM s/w patient bedside regarding M&B resources  Patient stating she plans to follow-up with OBGYN with [facility] early next week  Patient provided with contact information for PATHS to complete MA application  CM available for any DCP needs      ANTONELLA Monge, ARPITA, VALERI-BRENT  05/18/22 10:54 AM

## 2022-05-18 NOTE — PROGRESS NOTES
Progress Note - Infectious Disease   Paticimanolo Breeding 28 y o  female MRN: [id number]  Unit/Bed#: -01 Encounter: [id number]      Impression/Plan:  1   Sepsis, POA with fever, tachycardia leukocytosis  Admission blood cultures remain negative to date  Suspect urinary source in patient with right flank tenderness and positive urine culture for E coli  WBC 25 K > 10K  -can transition ceftriaxone to Cefadroxil as below  -monitor temperature and hemodynamics  -serial exam     2  Right pyelonephritis  Patient was right flank tenderness  Urine culture with [phone] colonies of E Coli sensitive to cephalosporins  Right upper quadrant ultrasound with mild heterogeneity of the cortex suspicious for inflammation  Ultrasound of kidney and bladder with bladder wall thickening  Flank tenderness improving  -Transition to cefadroxil 500 mg PO q 12 hours on discharge to complete total 1 week antibiotic course through 22  -monitor temperature and hemodynamics  -serial exam  -follow-up urinary symptoms  -follow-up urine output     3   5-6 weeks gestation of pregnancy  05/15/2022 hCG qualitative pregnancy positive  No vaginal bleeding noted  22 hCG 5246  -Ob following  -follow-up serial hCG quantitative levels  -outpatient OB f/u   Trichomonas vaginalis infection  -patient treated with 2 g of metronidazole in the ED  -partner should be treated as well  -partner education management per primary care team     Antibiotics:  Ceftriaxone D4     Above impression and plan discussed in detail with patient, RN, and primary care team     Subjective:  Patient has no fever, chills, sweats overnight; no nausea, vomiting, diarrhea; no cough, shortness of breath; less RUQ/flank pain  No  Dysuria, no new symptoms      Objective:  Vitals:  Temp:  [97 6 °F (36 4 °C)-97 8 °F (36 6 °C)] 97 6 °F (36 4 °C)  HR:  [60-90] 60  Resp:  [16-17] 16  BP: (111-115)/(64-79) 111/64  SpO2:  [100 %] 100 %  Temp (24hrs), Av 7 °F (36 5 °C), Min:97 6 °F (36 4 °C), Max:97 8 °F (36 6 °C)  Current: Temperature: 97 6 °F (36 4 °C)    Physical Exam:   General Appearance:  Alert, interactive, nontoxic, no acute distress  Throat: Oropharynx moist without lesions  Lungs:   Clear to auscultation bilaterally; no wheezes, rhonchi or rales; respirations unlabored   Heart:  RRR; no murmur   Abdomen:   Soft, no focal RUQT, non-distended, positive bowel sounds  Extremities: No clubbing, cyanosis or edema   : No tavares, no SPT, no CVAT, no right sided flank palpable point tenderness   Skin: No new rashes or lesions  IV site nontender  No draining wounds noted  Labs, Imaging, & Other studies:   All pertinent labs and imaging studies were personally reviewed  Results from last 7 days   Lab Units 05/18/22  0456 05/17/22  0508 05/16/22 2037   WBC Thousand/uL 10 51* 13 78* 16 29*   HEMOGLOBIN g/dL 8 9* 9 7* 10 3*   PLATELETS Thousands/uL 276 280 289     Results from last 7 days   Lab Units 05/18/22  0456 05/17/22  0508 05/16/22 2037 05/16/22  0430 05/15/22  2116   SODIUM mmol/L 133* 135 132*   < > 129*   POTASSIUM mmol/L 3 9 4 1 3 8   < > 3 3*   CHLORIDE mmol/L 104 103 103   < > 95*   CO2 mmol/L 20* 22 21   < > 19*   BUN mg/dL 3* 2* 4*   < > 4*   CREATININE mg/dL 0 67 0 72 0 65   < > 0 85   EGFR ml/min/1 73sq m 120 114 121   < > 93   CALCIUM mg/dL 8 8 9 0 9 5   < > 10 1   AST U/L 10* 7*  --   --  9*   ALT U/L 7 5*  --   --  9   ALK PHOS U/L 39 39  --   --  48    < > = values in this interval not displayed  Results from last 7 days   Lab Units 05/15/22  2234 05/15/22  2123 05/15/22  2116   BLOOD CULTURE   --  No Growth at 48 hrs  No Growth at 48 hrs     URINE CULTURE  50,000-59,000 cfu/ml Escherichia coli*  --   --          Results from last 7 days   Lab Units 05/16/22  0430   CRP mg/L 138 4*

## 2022-05-19 ENCOUNTER — APPOINTMENT (OUTPATIENT)
Dept: LAB | Facility: CLINIC | Age: 27
End: 2022-05-19
Payer: COMMERCIAL

## 2022-05-19 DIAGNOSIS — Z3A.01 LESS THAN 8 WEEKS GESTATION OF PREGNANCY: ICD-10-CM

## 2022-05-19 LAB — B-HCG SERPL-ACNC: 6010 MIU/ML

## 2022-05-19 PROCEDURE — 36415 COLL VENOUS BLD VENIPUNCTURE: CPT

## 2022-05-19 PROCEDURE — 84702 CHORIONIC GONADOTROPIN TEST: CPT

## 2022-05-20 ENCOUNTER — HOSPITAL ENCOUNTER (OUTPATIENT)
Dept: ULTRASOUND IMAGING | Facility: HOSPITAL | Age: 27
Discharge: HOME | End: 2022-05-20
Attending: OBSTETRICS & GYNECOLOGY
Payer: COMMERCIAL

## 2022-05-20 DIAGNOSIS — Z34.90 EARLY STAGE OF PREGNANCY: ICD-10-CM

## 2022-05-20 PROCEDURE — 76815 OB US LIMITED FETUS(S): CPT

## 2022-05-21 ENCOUNTER — APPOINTMENT (OUTPATIENT)
Dept: LAB | Facility: HOSPITAL | Age: 27
End: 2022-05-21
Attending: OBSTETRICS & GYNECOLOGY
Payer: COMMERCIAL

## 2022-05-21 DIAGNOSIS — Z11.3 SCREENING FOR STD (SEXUALLY TRANSMITTED DISEASE): ICD-10-CM

## 2022-05-21 DIAGNOSIS — Z3A.01 LESS THAN 8 WEEKS GESTATION OF PREGNANCY: ICD-10-CM

## 2022-05-21 LAB
B-HCG SERPL-ACNC: ABNORMAL MIU/ML (ref 0–11.6)
BACTERIA BLD CULT: NORMAL
BACTERIA BLD CULT: NORMAL

## 2022-05-21 PROCEDURE — 36415 COLL VENOUS BLD VENIPUNCTURE: CPT

## 2022-05-21 PROCEDURE — 87591 N.GONORRHOEAE DNA AMP PROB: CPT

## 2022-05-21 PROCEDURE — 87491 CHLMYD TRACH DNA AMP PROBE: CPT

## 2022-05-21 PROCEDURE — 84702 CHORIONIC GONADOTROPIN TEST: CPT

## 2022-05-24 LAB
C TRACH DNA SPEC QL NAA+PROBE: NEGATIVE
N GONORRHOEA DNA SPEC QL NAA+PROBE: NEGATIVE

## 2022-05-25 ENCOUNTER — OFFICE VISIT (OUTPATIENT)
Dept: OBGYN CLINIC | Facility: CLINIC | Age: 27
End: 2022-05-25
Payer: COMMERCIAL

## 2022-05-25 ENCOUNTER — TELEPHONE (OUTPATIENT)
Dept: OBGYN CLINIC | Facility: CLINIC | Age: 27
End: 2022-05-25

## 2022-05-25 VITALS
BODY MASS INDEX: 24.33 KG/M2 | SYSTOLIC BLOOD PRESSURE: 104 MMHG | HEIGHT: 67 IN | WEIGHT: 155 LBS | DIASTOLIC BLOOD PRESSURE: 60 MMHG

## 2022-05-25 DIAGNOSIS — Z3A.01 LESS THAN 8 WEEKS GESTATION OF PREGNANCY: ICD-10-CM

## 2022-05-25 DIAGNOSIS — Z36.9 FIRST TRIMESTER SCREENING: Primary | ICD-10-CM

## 2022-05-25 DIAGNOSIS — Z36.9 FIRST TRIMESTER SCREENING: ICD-10-CM

## 2022-05-25 DIAGNOSIS — Z01.419 ENCOUNTER FOR GYNECOLOGICAL EXAMINATION WITHOUT ABNORMAL FINDING: Primary | ICD-10-CM

## 2022-05-25 LAB
SL AMB  POCT GLUCOSE, UA: NORMAL
SL AMB LEUKOCYTE ESTERASE,UA: NORMAL
SL AMB POCT NITRITE,UA: NORMAL
SL AMB POCT URINE PROTEIN: NORMAL

## 2022-05-25 PROCEDURE — G0145 SCR C/V CYTO,THINLAYER,RESCR: HCPCS | Performed by: OBSTETRICS & GYNECOLOGY

## 2022-05-25 PROCEDURE — 99385 PREV VISIT NEW AGE 18-39: CPT | Performed by: OBSTETRICS & GYNECOLOGY

## 2022-05-25 NOTE — PROGRESS NOTES
Jeni Hernandez is a 28 y o  female who presents for annual well woman exam      Patient was admitted to the hospital during this pregnancy secondary to right pyelonephritis was having fever and flank pain  Also patient was positive for Trichomonas patient and partner were both treated        Menstrual History:  OB History        4    Para        Term                AB   2    Living   1       SAB        IAB   2    Ectopic        Multiple        Live Births                      Patient's last menstrual period was 2022 (exact date)  Period Cycle (Days): 28  Period Duration (Days): 5  Period Pattern: Regular  Menstrual Flow: Moderate  Dysmenorrhea: None    The following portions of the patient's history were reviewed and updated as appropriate: allergies, current medications, past family history, past medical history, past social history, past surgical history and problem list     Review of Systems  Review of Systems       Objective      /60 (BP Location: Left arm, Patient Position: Sitting, Cuff Size: Adult)   Ht 5' 7" (1 702 m)   Wt 70 3 kg (155 lb)   LMP 2022 (Exact Date) Comment: pregnancy confirmed by home testing  BMI 24 28 kg/m²     Physical Exam  OBGyn Exam     General:   alert and oriented, in no acute distress, alert, appears stated age and cooperative   Heart: regular rate and rhythm, S1, S2 normal, no murmur, click, rub or gallop   Lungs: clear to auscultation bilaterally   Abdomen: soft, non-tender, without masses or organomegaly   Vulva: normal   Vagina: normal mucosa, normal discharge   Cervix: no cervical motion tenderness and no lesions   Uterus: size consistent with 6 weeks   Adnexa:  Breast Exam:  normal adnexa  breasts appear normal, no suspicious masses, no skin or nipple changes or axillary nodes            bedside ultrasound performed today  Last menstrual period 2022 IUP 6 weeks 5 days SYLVESTER 2023  Gestational sac with fetal pole noted by CRL 6 weeks 2 days fetal heart rate 110 beats per minute     Assessment      @well woman@   76-year-old female  Early pregnancy LMP 04/07/2022 SYLVESTER January 12, 2023   Prior 1 vaginal delivery without complication   Trichomonas positive in this pregnancy treated   Will repeat test in 3 week   Pyelonephritis treated in this pregnancy   Plan   Return to office for OB intake   Repeat urine culture next visit   Affirm test in 3 weeks   Prenatal vitamin daily   Referred to M for 1st trimester screen  Plan      All questions answered  There are no Patient Instructions on file for this visit

## 2022-05-25 NOTE — TELEPHONE ENCOUNTER
Patient was here for an appointment , [facility] paperwork not complete, advised we will call when ready

## 2022-05-31 NOTE — TELEPHONE ENCOUNTER
Spoke with patient advised her FMLA paper work would be used for later in pregnancy for maternity leave  Advised patient to call her HR to see if needs forms for days she was admitted to hospital of if a "return to work" letter would be acceptable  Patient to call back and let office know

## 2022-06-02 LAB
LAB AP GYN PRIMARY INTERPRETATION: NORMAL
Lab: NORMAL

## 2022-06-10 ENCOUNTER — INITIAL PRENATAL (OUTPATIENT)
Dept: OBGYN CLINIC | Facility: CLINIC | Age: 27
End: 2022-06-10
Payer: COMMERCIAL

## 2022-06-10 VITALS
BODY MASS INDEX: 23.07 KG/M2 | SYSTOLIC BLOOD PRESSURE: 120 MMHG | DIASTOLIC BLOOD PRESSURE: 70 MMHG | HEIGHT: 68 IN | WEIGHT: 152.2 LBS

## 2022-06-10 DIAGNOSIS — Z34.91 PRENATAL CARE IN FIRST TRIMESTER: Primary | ICD-10-CM

## 2022-06-10 DIAGNOSIS — Z36.9 FIRST TRIMESTER SCREENING: ICD-10-CM

## 2022-06-10 DIAGNOSIS — Z34.81 MULTIGRAVIDA IN FIRST TRIMESTER: ICD-10-CM

## 2022-06-10 DIAGNOSIS — Z13.71 GENETIC DISEASE CARRIER STATUS TESTING, FEMALE: ICD-10-CM

## 2022-06-10 PROCEDURE — T1001 NURSING ASSESSMENT/EVALUATN: HCPCS | Performed by: OBSTETRICS & GYNECOLOGY

## 2022-06-10 NOTE — PROGRESS NOTES
OB Intake    Patient presents for OB intake interview  Accompanied by: RODRICK Zamudio    Planned pregnancy, FOB involved and supportive  [id number]    Hx of  delivery prior to 36 weeks 6 days: No  Hx of hypertension:  No  Patient's last menstrual period was 2022 (exact date)  Estimated Date of Delivery: 2023  Signs and Symptoms of pregnancy:  - Some feet and ankle swelling , no dizziness or visual problems, has nausea with no vomiting   - Constipation: No  - Headaches: No  - Cramping/spotting: No  - PICA cravings: No  - Cats:No  - Diabetes:    History of gestational diabetes : No   BMI >35  : No   Advance maternal age >35 : No   First degree relative with type 2 diabetes : NO   History of PCOS : No   Current metformin use : No   Prior history of macrosomia or LGA : No    Prenatal labs including: CBC,ABO, Rubella, Hepatitis, RPR,HIV, Cystic fibrosis and spinal muscular atrophy carrier screen, varicella, hemoglobin electrophoresis  Last Pap:  2022  Tdap - counseled to be given after 27 weeks  Influenza vaccine discussed and information sheet given  Vaccinated: No  COVId Vaccine :yes    Hx of MRSA: No  Dental visit with last 6 months - no, recommendations discussed  Interview education:  [facility] Pregnancy Essentials booklet reviewed and explained  Handouts given at today's visit     [facility] phone application guide   [address] support center   [facility] Maternal Fetal Medicine        Sequential screening pamphlet                   Cystic fibrosis information    [facility]: yes  ONAF form submitted: yes    [facility] activated: Yes    Interview done by : Malou Pearl LPN

## 2022-07-08 ENCOUNTER — HOSPITAL ENCOUNTER (EMERGENCY)
Facility: HOSPITAL | Age: 27
Discharge: HOME | End: 2022-07-08
Attending: EMERGENCY MEDICINE
Payer: COMMERCIAL

## 2022-07-08 ENCOUNTER — TELEPHONE (OUTPATIENT)
Dept: PERINATAL CARE | Facility: OTHER | Age: 27
End: 2022-07-08

## 2022-07-08 VITALS
TEMPERATURE: 97.9 F | DIASTOLIC BLOOD PRESSURE: 66 MMHG | HEART RATE: 89 BPM | SYSTOLIC BLOOD PRESSURE: 110 MMHG | OXYGEN SATURATION: 100 % | RESPIRATION RATE: 16 BRPM

## 2022-07-08 DIAGNOSIS — U07.1 COVID: ICD-10-CM

## 2022-07-08 DIAGNOSIS — B34.9 VIRAL SYNDROME: Primary | ICD-10-CM

## 2022-07-08 DIAGNOSIS — N12 PYELONEPHRITIS: ICD-10-CM

## 2022-07-08 LAB
FLUAV RNA RESP QL NAA+PROBE: NEGATIVE
FLUBV RNA RESP QL NAA+PROBE: NEGATIVE
RSV RNA RESP QL NAA+PROBE: NEGATIVE
SARS-COV-2 RNA RESP QL NAA+PROBE: POSITIVE

## 2022-07-08 PROCEDURE — 0241U HB NFCT DS VIR RESP RNA 4 TRGT: CPT | Performed by: EMERGENCY MEDICINE

## 2022-07-08 PROCEDURE — 99284 EMERGENCY DEPT VISIT MOD MDM: CPT | Performed by: EMERGENCY MEDICINE

## 2022-07-08 PROCEDURE — 99283 EMERGENCY DEPT VISIT LOW MDM: CPT

## 2022-07-08 RX ORDER — ACETAMINOPHEN 325 MG/1
650 TABLET ORAL EVERY 6 HOURS PRN
Qty: 20 TABLET | Refills: 0 | Status: SHIPPED | OUTPATIENT
Start: 2022-07-08

## 2022-07-08 RX ORDER — GUAIFENESIN/DEXTROMETHORPHAN 100-10MG/5
5 SYRUP ORAL 4 TIMES DAILY PRN
Qty: 118 ML | Refills: 0 | Status: SHIPPED | OUTPATIENT
Start: 2022-07-08

## 2022-07-08 NOTE — TELEPHONE ENCOUNTER
Called patient to offer NT appointment (from urgent list):    Spoke with patient - pt declined appt 7/11/22  7:15  Mansi Lemos    Scheduled pt for Genetic counseling class

## 2022-07-08 NOTE — DISCHARGE INSTRUCTIONS
Test should take approximately 2 hours we will call you or your results will come through on my chart

## 2022-07-08 NOTE — Clinical Note
Balwinder De León was seen and treated in our emergency department on 7/8/2022  Diagnosis:     Raf Cai  may return to work on return date  She may return on this date: 07/13/2022         If you have any questions or concerns, please don't hesitate to call        Anam Tapia, DO    ______________________________           _______________          _______________  Hospital Representative                              Date                                Time

## 2022-07-08 NOTE — ED PROVIDER NOTES
History  Chief Complaint   Patient presents with    Flu Symptoms     Pt presents to ED from home w/ flu like s/s, starting three days ago  Pt exposed to covid would like to be tested  65-year-old female comes in for evaluation of flu-like symptoms  Patient states her symptoms started approximately 3 days ago  Patient complains of cough and congestion  Patient believes she was exposed to someone with COVID would like to be tested  Patient is also approximately 12 weeks pregnant  Denies any vaginal bleeding or cramping  History provided by:  Patient and medical records   used: No    Flu Symptoms  Presenting symptoms: fatigue and myalgias    Presenting symptoms: no cough, no diarrhea, no fever, no headaches and no shortness of breath    Severity:  Mild  Onset quality:  Sudden  Duration:  3 days  Progression:  Worsening  Chronicity:  New  Ineffective treatments:  None tried  Associated symptoms: decreased appetite, decreased physical activity and nasal congestion    Associated symptoms: no ear pain    Risk factors: pregnancy        Prior to Admission Medications   Prescriptions Last Dose Informant Patient Reported? Taking? Prenatal MV-Min-Fe Fum-FA-DHA (PRENATAL 1 PO)   Yes No   Sig: Take by mouth   acetaminophen (TYLENOL) 325 mg tablet   No No   Sig: Take 2 tablets (650 mg total) by mouth every 6 (six) hours as needed for mild pain   Patient not taking: Reported on 6/10/2022   acetaminophen (TYLENOL) 325 mg tablet   No Yes   Sig: Take 2 tablets (650 mg total) by mouth every 6 (six) hours as needed for mild pain or fever   pyridoxine (VITAMIN B6) 50 mg tablet   No No   Sig: Take 1 tablet (50 mg total) by mouth as needed in the morning (nausea/vomiting)     Patient not taking: Reported on 6/10/2022      Facility-Administered Medications: None       Past Medical History:   Diagnosis Date    Anemia     Asthma     Chronic kidney disease     Gonorrhea     Urogenital trichomoniasis Past Surgical History:   Procedure Laterality Date    DILATION AND CURETTAGE OF UTERUS         Family History   Problem Relation Age of Onset    Sickle cell trait Mother     No Known Problems Father     Heart murmur Brother     No Known Problems Son     Diabetes Maternal Grandmother     Hypertension Maternal Grandmother     No Known Problems Maternal Grandfather     No Known Problems Paternal Grandmother     No Known Problems Paternal Grandfather     No Known Problems Half-Sister     No Known Problems Half-Sister     No Known Problems Half-Sister     No Known Problems Half-Brother      I have reviewed and agree with the history as documented  E-Cigarette/Vaping    E-Cigarette Use Never User      E-Cigarette/Vaping Substances    Nicotine No     THC Yes     CBD No     Flavoring No     Other No     Unknown No      Social History     Tobacco Use    Smoking status: Never Smoker    Smokeless tobacco: Never Used   Vaping Use    Vaping Use: Never used   Substance Use Topics    Alcohol use: Not Currently     Alcohol/week: 1 0 standard drink     Types: 1 Shots of liquor per week    Drug use: Not Currently     Types: Marijuana     Comment: daily       Review of Systems   Constitutional: Positive for decreased appetite and fatigue  Negative for fever  HENT: Positive for congestion  Negative for ear pain  Eyes: Negative for discharge and redness  Respiratory: Negative for apnea, cough, shortness of breath and wheezing  Cardiovascular: Negative for chest pain  Gastrointestinal: Negative for abdominal pain and diarrhea  Endocrine: Negative for cold intolerance and polydipsia  Genitourinary: Negative for difficulty urinating and hematuria  Musculoskeletal: Positive for myalgias  Negative for arthralgias and back pain  Skin: Negative for color change and rash  Allergic/Immunologic: Negative for environmental allergies and immunocompromised state     Neurological: Negative for numbness and headaches  Hematological: Negative for adenopathy  Does not bruise/bleed easily  Psychiatric/Behavioral: Negative for agitation and behavioral problems  Physical Exam  Physical Exam  Vitals and nursing note reviewed  Constitutional:       Appearance: Normal appearance  She is well-developed  She is not toxic-appearing  HENT:      Head: Normocephalic and atraumatic  Right Ear: Tympanic membrane and external ear normal       Left Ear: Tympanic membrane and external ear normal       Nose: Nose normal  No nasal deformity or rhinorrhea  Mouth/Throat:      Dentition: Normal dentition  Pharynx: Uvula midline  Eyes:      General: Lids are normal          Right eye: No discharge  Left eye: No discharge  Conjunctiva/sclera: Conjunctivae normal       Pupils: Pupils are equal, round, and reactive to light  Neck:      Vascular: No carotid bruit or JVD  Trachea: Trachea normal    Cardiovascular:      Rate and Rhythm: Normal rate and regular rhythm  No extrasystoles are present  Chest Wall: PMI is not displaced  Pulses: Normal pulses  Pulmonary:      Effort: Pulmonary effort is normal  No accessory muscle usage or respiratory distress  Breath sounds: Normal breath sounds  No wheezing, rhonchi or rales  Abdominal:      General: Bowel sounds are normal       Palpations: Abdomen is soft  Abdomen is not rigid  There is no mass  Tenderness: There is no abdominal tenderness  There is no guarding or rebound  Musculoskeletal:      Right shoulder: No swelling, deformity or bony tenderness  Normal range of motion  Cervical back: Normal range of motion and neck supple  No deformity, tenderness or bony tenderness  Lymphadenopathy:      Cervical: No cervical adenopathy  Skin:     General: Skin is warm and dry  Findings: No rash  Neurological:      Mental Status: She is alert and oriented to person, place, and time        GCS: GCS eye subscore is 4  GCS verbal subscore is 5  GCS motor subscore is 6  Cranial Nerves: No cranial nerve deficit  Sensory: No sensory deficit  Deep Tendon Reflexes: Reflexes are normal and symmetric  Psychiatric:         Speech: Speech normal          Behavior: Behavior normal          Vital Signs  ED Triage Vitals [07/08/22 1300]   Temperature Pulse Respirations Blood Pressure SpO2   97 9 °F (36 6 °C) 89 16 110/66 100 %      Temp Source Heart Rate Source Patient Position - Orthostatic VS BP Location FiO2 (%)   Oral -- -- -- --      Pain Score       --           Vitals:    07/08/22 1300   BP: 110/66   Pulse: 89         Visual Acuity      ED Medications  Medications - No data to display    Diagnostic Studies  Results Reviewed     Procedure Component Value Units Date/Time    FLU/RSV/COVID - if FLU/RSV clinically relevant [[id number]]  (Abnormal) Collected: 07/08/22 1308    Lab Status: Final result Specimen: Nares from Nose Updated: 07/08/22 1353     SARS-CoV-2 Positive     INFLUENZA A PCR Negative     INFLUENZA B PCR Negative     RSV PCR Negative    Narrative:      FOR PEDIATRIC PATIENTS - copy/paste COVID Guidelines URL to browser: [email or web address]  [employer]    SARS-CoV-2 assay is a Nucleic Acid Amplification assay intended for the  qualitative detection of nucleic acid from SARS-CoV-2 in nasopharyngeal  swabs  Results are for the presumptive identification of SARS-CoV-2 RNA  Positive results are indicative of infection with SARS-CoV-2, the virus  causing COVID-19, but do not rule out bacterial infection or co-infection  with other viruses  Laboratories within the [place] and its  territories are required to report all positive results to the appropriate  public health authorities  Negative results do not preclude SARS-CoV-2  infection and should not be used as the sole basis for treatment or other  patient management decisions   Negative results must be combined with  clinical observations, patient history, and epidemiological information  This test has not been FDA cleared or approved  This test has been authorized by FDA under an Emergency Use Authorization  (EUA)  This test is only authorized for the duration of time the  declaration that circumstances exist justifying the authorization of the  emergency use of an in vitro diagnostic tests for detection of SARS-CoV-2  virus and/or diagnosis of COVID-19 infection under section 564(b)(1) of  the Act, 21 U  S C  670WEB-5(M)(1), unless the authorization is terminated  or revoked sooner  The test has been validated but independent review by FDA  and CLIA is pending  Test performed using [employer] GeneXpert: This RT-PCR assay targets N2,  a region unique to SARS-CoV-2  A conserved region in the E-gene was chosen  for pan-Sarbecovirus detection which includes SARS-CoV-2                   No orders to display              Procedures  Procedures         ED Course                                             MDM  Number of Diagnoses or Management Options  Viral syndrome: new and requires workup     Amount and/or Complexity of Data Reviewed  Clinical lab tests: ordered  Tests in the medicine section of CPT®: ordered    Patient Progress  Patient progress: stable      Disposition  Final diagnoses:   Viral syndrome   COVID     Time reflects when diagnosis was documented in both MDM as applicable and the Disposition within this note     Time User Action Codes Description Comment    7/8/2022  1:10 PM Cj Dyer Add [B34 9] Viral syndrome     7/8/2022  1:10 PM Aditi MERCER Add [N12] Pyelonephritis     7/8/2022  1:10 PM Jacques Florida Modify [N12] Pyelonephritis     7/8/2022  1:11 PM Jacques Florida Modify [N12] Pyelonephritis     7/8/2022  4:42 PM Jacques Florida Add [U07 1] [facility]       ED Disposition     ED Disposition   Discharge    Condition   Stable    Date/Time   Fri Jul 8, 2022  1:11 PM    Comment Arabella Ramirez discharge to home/self care  Follow-up Information     Follow up With Specialties Details Why Contact Info Additional Information    [facility] Schedule an appointment as soon as possible for a visit   [address] [id number]  [id number] , [place], [place], [address]          Discharge Medication List as of 7/8/2022  1:12 PM      START taking these medications    Details   dextromethorphan-guaiFENesin (ROBITUSSIN DM)  mg/5 mL syrup Take 5 mL by mouth 4 (four) times a day as needed for cough or congestion, Starting Fri 7/8/2022, Normal         CONTINUE these medications which have CHANGED    Details   acetaminophen (TYLENOL) 325 mg tablet Take 2 tablets (650 mg total) by mouth every 6 (six) hours as needed for mild pain or fever, Starting Fri 7/8/2022, Normal         CONTINUE these medications which have NOT CHANGED    Details   Prenatal MV-Min-Fe Fum-FA-DHA (PRENATAL 1 PO) Take by mouth, Historical Med      pyridoxine (VITAMIN B6) 50 mg tablet Take 1 tablet (50 mg total) by mouth as needed in the morning (nausea/vomiting)  , Starting Wed 5/18/2022, Normal             No discharge procedures on file      PDMP Review     None          ED Provider  Electronically Signed by           Ector Gonzalez DO  07/08/22 3670

## 2022-07-17 ENCOUNTER — TELEPHONE (OUTPATIENT)
Dept: LABOR AND DELIVERY | Facility: HOSPITAL | Age: 27
End: 2022-07-17

## 2022-07-17 ENCOUNTER — NURSE TRIAGE (OUTPATIENT)
Dept: OTHER | Facility: OTHER | Age: 27
End: 2022-07-17

## 2022-07-17 NOTE — TELEPHONE ENCOUNTER
Regardinw pregnant, cramping and bleeding  ----- Message from Herminio Vilchis sent at 2022  3:15 PM EDT -----  "I am 13 weeks and i'm having cramping and bleeding "

## 2022-07-17 NOTE — TELEPHONE ENCOUNTER
Patient called after receiving health call with concerns vaginal bleeding- patient reports she began having vaginal bleeding this morning that came in and gush and has since decreased but continues to have some bleeding on her pad  Patient is unclear of her blood type and she is unclear if she is need of RhoGAM in the past  She has yet to complete her initial prenatal labs- given this recommend patient present to ED for blood type, CBC, possible RhoGAM if needed and ultrasound to check on fetal well-being  Patient agreeable with plan

## 2022-07-17 NOTE — TELEPHONE ENCOUNTER
Reason for Disposition   MILD vaginal bleeding (i e , less than 1 pad / hour; less than patient's usual menstrual bleeding; not just spotting)    Answer Assessment - Initial Assessment Questions  1  ONSET: "When did this bleeding start?"        This afternoon  2  DESCRIPTION: "Describe the bleeding that you are having " "How much bleeding is there?"     - SPOTTING: spotting, or pinkish / brownish mucous discharge; does not fill panti-liner or pad     - MILD:  less than 1 pad / hour; less than patient's usual menstrual bleeding    - MODERATE: 1-2 pads / hour; 1 menstrual cup every 6 hours; small-medium blood clots (e g , pea, grape, small coin)    - SEVERE: soaking 2 or more pads/hour for 2 or more hours; 1 menstrual cup every 2 hours; bleeding not contained by pads or continuous red blood from vagina; large blood clots (e g , golf ball, large coin)       A good amount in the toilet but not much in a pad  3  ABDOMINAL PAIN SEVERITY: If present, ask: "How bad is it?"  (e g , Scale 1-10; mild, moderate, or severe)    - MILD (1-3): doesn't interfere with normal activities, abdomen soft and not tender to touch     - MODERATE (4-7): interferes with normal activities or awakens from sleep, tender to touch     - SEVERE (8-10): excruciating pain, doubled over, unable to do any normal activities      Cramps for three days/ today bleeding started  4  PREGNANCY: "Do you know how many weeks or months pregnant you are?" "When was the first day of your last normal menstrual period?"      13 weeks  5  HEMODYNAMIC STATUS: "Are you weak or feeling lightheaded?" If Yes, ask: "Can you stand and walk normally?"       no  6  OTHER SYMPTOMS: "What other symptoms are you having with the bleeding?" (e g , passed tissue, vaginal discharge, fever, menstrual-type cramps)      Cramping for the last three days, blood can be seen with wiping   Small amount in the pad    Protocols used: PREGNANCY - VAGINAL BLEEDING LESS THAN 20 WEEKS EGA-ADULT-AH

## 2022-07-18 ENCOUNTER — ROUTINE PRENATAL (OUTPATIENT)
Dept: OBGYN CLINIC | Facility: CLINIC | Age: 27
End: 2022-07-18
Payer: COMMERCIAL

## 2022-07-18 VITALS
HEIGHT: 68 IN | WEIGHT: 159 LBS | BODY MASS INDEX: 24.1 KG/M2 | DIASTOLIC BLOOD PRESSURE: 60 MMHG | SYSTOLIC BLOOD PRESSURE: 108 MMHG

## 2022-07-18 DIAGNOSIS — O46.90 VAGINAL BLEEDING IN PREGNANT PATIENT AFTER FIRST TRIMESTER: Primary | ICD-10-CM

## 2022-07-18 PROCEDURE — 87480 CANDIDA DNA DIR PROBE: CPT | Performed by: OBSTETRICS & GYNECOLOGY

## 2022-07-18 PROCEDURE — 87660 TRICHOMONAS VAGIN DIR PROBE: CPT | Performed by: OBSTETRICS & GYNECOLOGY

## 2022-07-18 PROCEDURE — 99213 OFFICE O/P EST LOW 20 MIN: CPT | Performed by: OBSTETRICS & GYNECOLOGY

## 2022-07-18 PROCEDURE — 87510 GARDNER VAG DNA DIR PROBE: CPT | Performed by: OBSTETRICS & GYNECOLOGY

## 2022-07-18 RX ORDER — ALBUTEROL SULFATE 90 UG/1
2 AEROSOL, METERED RESPIRATORY (INHALATION) EVERY 6 HOURS PRN
COMMUNITY

## 2022-07-18 NOTE — PROGRESS NOTES
Assessment/Plan:     Diagnoses and all orders for this visit:    Vaginal bleeding in pregnant patient after first trimester  -     hCG, quantitative; Standing  -     Type and screen; Future  -     hCG, quantitative  -     US pelvis complete w transvaginal; Future    Other orders  -     albuterol (PROVENTIL HFA,VENTOLIN HFA) 90 mcg/act inhaler; Inhale 2 puffs every 6 (six) hours as needed      23-year-old female  Prior vaginal delivery   Nonviable pregnancy/miscarriage  History of Trichomonas treated  Plan  Affirm   Quantitative HCG with type and screen   Repeat ultrasound in 1 weeks   Return to office in 2 weeks to discuss all results  i    Subjective:      Patient ID: Jesse Cazares is a 28 y o  female  HPI  23-year-old female presents to the office today secondary of vaginal bleeding   Patient was having vaginal spotting last week then have heavy vaginal bleeding yesterday and passing clots   Patient also was having diarrhea, patient was having pain prior to the bleeding and clots  Patient does not know her blood type, did not go for blood work   Having only vaginal spotting now denies any heavy bleeding denies any nausea vomiting  The following portions of the patient's history were reviewed and updated as appropriate: allergies, current medications, past family history, past medical history, past social history, past surgical history and problem list     Review of Systems      Objective:      /60 (BP Location: Left arm, Patient Position: Sitting, Cuff Size: Adult)   Ht 5' 8" (1 727 m)   Wt 72 1 kg (159 lb)   LMP 04/07/2022 (Exact Date) Comment: pregnancy confirmed by home testing  BMI 24 18 kg/m²          Physical Exam  Constitutional:       Appearance: She is well-developed  Abdominal:      General: There is no distension  Palpations: Abdomen is soft  Tenderness: There is no abdominal tenderness  Genitourinary:     Labia:         Right: No rash, tenderness or lesion           Left: No rash, tenderness or lesion  Vagina: No signs of injury  Vaginal discharge present  No erythema or tenderness  Cervix: Discharge present  No cervical motion tenderness or friability  Adnexa:         Right: No mass, tenderness or fullness  Left: No mass, tenderness or fullness  Comments: Light pink vaginal discharge noted affirm obtained secondary to history of trich  Cervix noted to be closed bedside ultrasound performed enlarge endometrial thickness noted with blood clots no IUP noted patient instructed to have repeated quantitative HCG today in 3 days and then have to repeat ultrasound in 1 week finding explained and discussed with patient type and screen also recommended All patient questions answered and patient was satisfied  Neurological:      Mental Status: She is alert and oriented to person, place, and time     Psychiatric:         Behavior: Behavior normal

## 2022-07-20 LAB
CANDIDA RRNA VAG QL PROBE: NEGATIVE
G VAGINALIS RRNA GENITAL QL PROBE: POSITIVE
T VAGINALIS RRNA GENITAL QL PROBE: NEGATIVE

## 2022-07-25 ENCOUNTER — APPOINTMENT (OUTPATIENT)
Dept: LAB | Facility: HOSPITAL | Age: 27
End: 2022-07-25
Attending: OBSTETRICS & GYNECOLOGY
Payer: COMMERCIAL

## 2022-07-25 DIAGNOSIS — O02.1 MISSED ABORTION: Primary | ICD-10-CM

## 2022-07-25 DIAGNOSIS — N76.0 BV (BACTERIAL VAGINOSIS): Primary | ICD-10-CM

## 2022-07-25 DIAGNOSIS — B96.89 BV (BACTERIAL VAGINOSIS): Primary | ICD-10-CM

## 2022-07-25 DIAGNOSIS — O46.90 VAGINAL BLEEDING IN PREGNANT PATIENT AFTER FIRST TRIMESTER: ICD-10-CM

## 2022-07-25 LAB
ABO GROUP BLD: NORMAL
BLD GP AB SCN SERPL QL: NEGATIVE
RH BLD: POSITIVE
SPECIMEN EXPIRATION DATE: NORMAL

## 2022-07-25 PROCEDURE — 86901 BLOOD TYPING SEROLOGIC RH(D): CPT

## 2022-07-25 PROCEDURE — 86850 RBC ANTIBODY SCREEN: CPT

## 2022-07-25 PROCEDURE — 86900 BLOOD TYPING SEROLOGIC ABO: CPT

## 2022-07-25 RX ORDER — METRONIDAZOLE 500 MG/1
500 TABLET ORAL EVERY 12 HOURS SCHEDULED
Qty: 14 TABLET | Refills: 0 | Status: SHIPPED | OUTPATIENT
Start: 2022-07-25 | End: 2022-08-01

## 2022-10-11 PROBLEM — N12 PYELONEPHRITIS: Status: RESOLVED | Noted: 2022-05-16 | Resolved: 2022-10-11

## 2022-10-11 PROBLEM — A41.9 SEPSIS (HCC): Status: RESOLVED | Noted: 2022-05-16 | Resolved: 2022-10-11

## 2022-12-15 ENCOUNTER — OFFICE VISIT (OUTPATIENT)
Dept: FAMILY MEDICINE CLINIC | Facility: CLINIC | Age: 27
End: 2022-12-15

## 2022-12-15 VITALS
HEART RATE: 82 BPM | RESPIRATION RATE: 16 BRPM | SYSTOLIC BLOOD PRESSURE: 110 MMHG | WEIGHT: 152 LBS | DIASTOLIC BLOOD PRESSURE: 72 MMHG | TEMPERATURE: 97.8 F | HEIGHT: 68 IN | BODY MASS INDEX: 23.04 KG/M2 | OXYGEN SATURATION: 98 %

## 2022-12-15 DIAGNOSIS — Z11.59 NEED FOR HEPATITIS C SCREENING TEST: ICD-10-CM

## 2022-12-15 DIAGNOSIS — Z11.4 SCREENING FOR HIV (HUMAN IMMUNODEFICIENCY VIRUS): ICD-10-CM

## 2022-12-15 DIAGNOSIS — G51.0 LEFT-SIDED BELL'S PALSY: ICD-10-CM

## 2022-12-15 DIAGNOSIS — F41.8 MIXED ANXIETY DEPRESSIVE DISORDER: Primary | ICD-10-CM

## 2022-12-15 DIAGNOSIS — Z00.00 ANNUAL PHYSICAL EXAM: ICD-10-CM

## 2022-12-15 DIAGNOSIS — Z28.21 INFLUENZA VACCINE REFUSED: ICD-10-CM

## 2022-12-15 RX ORDER — HYDROXYZINE HYDROCHLORIDE 10 MG/1
10 TABLET, FILM COATED ORAL EVERY 8 HOURS PRN
Qty: 30 TABLET | Refills: 0 | Status: SHIPPED | OUTPATIENT
Start: 2022-12-15

## 2022-12-15 RX ORDER — SERTRALINE HYDROCHLORIDE 25 MG/1
25 TABLET, FILM COATED ORAL DAILY
Qty: 30 TABLET | Refills: 2 | Status: SHIPPED | OUTPATIENT
Start: 2022-12-15

## 2022-12-16 NOTE — PROGRESS NOTES
Subjective:      Patient ID: Geoff Ash is a 28 y o  female  Has anxiety since she was a teenager, saw therapist few times, no recent treatment, never tried medications   She had a missed  in   She is not trying to conceive but also does not use precautions or contraception  She is interested in discussing medications  States She smokes medical marijuana for anxiety    Anxiety  Symptoms include nervous/anxious behavior  Patient reports no chest pain, dizziness, nausea, palpitations, shortness of breath or suicidal ideas  Past Medical History:   Diagnosis Date   • Anemia    • Anxiety    • Asthma    • Chronic kidney disease    • Gonorrhea    • Urogenital trichomoniasis        Family History   Problem Relation Age of Onset   • Sickle cell trait Mother    • No Known Problems Father    • Heart murmur Brother    • No Known Problems Son    • Diabetes Maternal Grandmother    • Hypertension Maternal Grandmother    • Stroke Maternal Grandmother    • No Known Problems Maternal Grandfather    • No Known Problems Paternal Grandmother    • No Known Problems Paternal Grandfather    • No Known Problems Half-Sister    • No Known Problems Half-Sister    • No Known Problems Half-Sister    • No Known Problems Half-Brother        Past Surgical History:   Procedure Laterality Date   • DILATION AND CURETTAGE OF UTERUS          reports that she has been smoking  She has never used smokeless tobacco  She reports current alcohol use of about 2 0 standard drinks per week  She reports current drug use  Frequency: 3 00 times per week  Drug: Marijuana        Current Outpatient Medications:   •  hydrOXYzine HCL (ATARAX) 10 mg tablet, Take 1 tablet (10 mg total) by mouth every 8 (eight) hours as needed for anxiety, Disp: 30 tablet, Rfl: 0  •  sertraline (Zoloft) 25 mg tablet, Take 1 tablet (25 mg total) by mouth daily, Disp: 30 tablet, Rfl: 2    The following portions of the patient's history were reviewed and updated as appropriate: allergies, current medications, past family history, past medical history, past social history, past surgical history and problem list     Review of Systems   Constitutional: Negative for activity change, appetite change, chills, diaphoresis, fatigue and fever  HENT: Negative for congestion, facial swelling, mouth sores, rhinorrhea, sinus pressure, sneezing, sore throat, tinnitus, trouble swallowing and voice change  Eyes: Negative for pain, discharge, redness and visual disturbance  Respiratory: Negative for cough, shortness of breath and wheezing  Cardiovascular: Negative for chest pain, palpitations and leg swelling  Gastrointestinal: Negative for abdominal pain, blood in stool, constipation, diarrhea and nausea  Endocrine: Negative for cold intolerance and heat intolerance  Genitourinary: Negative for dysuria, hematuria and urgency  Musculoskeletal: Negative for arthralgias, back pain and myalgias  Skin: Negative for rash and wound  Allergic/Immunologic: Negative for environmental allergies and food allergies  Neurological: Negative for dizziness, tremors, seizures, syncope, facial asymmetry, speech difficulty, weakness, light-headedness, numbness and headaches  Hematological: Negative for adenopathy  Psychiatric/Behavioral: Negative for agitation, behavioral problems, self-injury and suicidal ideas  The patient is nervous/anxious            PHQ-2/9 Depression Screening    Little interest or pleasure in doing things: 2 - more than half the days  Feeling down, depressed, or hopeless: 1 - several days  Trouble falling or staying asleep, or sleeping too much: 2 - more than half the days  Feeling tired or having little energy: 3 - nearly every day  Poor appetite or overeating: 3 - nearly every day  Feeling bad about yourself - or that you are a failure or have let yourself or your family down: 1 - several days  Trouble concentrating on things, such as reading the newspaper or watching television: 1 - several days  Moving or speaking so slowly that other people could have noticed  Or the opposite - being so fidgety or restless that you have been moving around a lot more than usual: 1 - several days  Thoughts that you would be better off dead, or of hurting yourself in some way: 0 - not at all  PHQ-2 Score: 3  PHQ-2 Interpretation: POSITIVE depression screen  PHQ-9 Score: 14   PHQ-9 Interpretation: Moderate depression        RENETTA-7 Flowsheet Screening    Flowsheet Row Most Recent Value   Over the last 2 weeks, how often have you been bothered by any of the following problems? Feeling nervous, anxious, or on edge 2   Not being able to stop or control worrying 2   Worrying too much about different things 3   Trouble relaxing 3   Being so restless that it is hard to sit still 1   Becoming easily annoyed or irritable 1   Feeling afraid as if something awful might happen 1   RENETTA-7 Total Score 13            Objective:    /72 (BP Location: Left arm, Patient Position: Sitting, Cuff Size: Adult)   Pulse 82   Temp 97 8 °F (36 6 °C) (Temporal)   Resp 16   Ht 5' 8" (1 727 m)   Wt 68 9 kg (152 lb)   SpO2 98%   BMI 23 11 kg/m²      Physical Exam  Vitals and nursing note reviewed  Constitutional:       Appearance: Normal appearance  She is well-developed  HENT:      Head: Normocephalic and atraumatic  Right Ear: Tympanic membrane, ear canal and external ear normal       Left Ear: Tympanic membrane, ear canal and external ear normal       Nose: Nose normal    Eyes:      General: No scleral icterus  Right eye: No discharge  Left eye: No discharge  Conjunctiva/sclera: Conjunctivae normal       Pupils: Pupils are equal, round, and reactive to light  Neck:      Thyroid: No thyromegaly  Cardiovascular:      Rate and Rhythm: Normal rate and regular rhythm  Heart sounds: Normal heart sounds  No murmur heard  No gallop     Pulmonary:      Effort: Pulmonary effort is normal       Breath sounds: Normal breath sounds  No wheezing or rales  Abdominal:      General: There is no distension  Palpations: Abdomen is soft  Tenderness: There is no abdominal tenderness  Musculoskeletal:         General: No tenderness or deformity  Cervical back: Normal range of motion and neck supple  Lymphadenopathy:      Cervical: No cervical adenopathy  Skin:     Findings: No erythema or rash  Neurological:      General: No focal deficit present  Mental Status: She is alert  Mental status is at baseline  Cranial Nerves: Facial asymmetry (left bells palsy) present     Psychiatric:         Mood and Affect: Mood normal          Behavior: Behavior normal            Recent Results (from the past 8736 hour(s))   Comprehensive metabolic panel    Collection Time: 05/15/22  9:16 PM   Result Value Ref Range    Sodium 129 (L) 135 - 147 mmol/L    Potassium 3 3 (L) 3 5 - 5 3 mmol/L    Chloride 95 (L) 96 - 108 mmol/L    CO2 19 (L) 21 - 32 mmol/L    ANION GAP 15 (H) 4 - 13 mmol/L    BUN 4 (L) 5 - 25 mg/dL    Creatinine 0 85 0 60 - 1 30 mg/dL    Glucose 113 65 - 140 mg/dL    Calcium 10 1 8 4 - 10 2 mg/dL    AST 9 (L) 13 - 39 U/L    ALT 9 7 - 52 U/L    Alkaline Phosphatase 48 34 - 104 U/L    Total Protein 8 5 (H) 6 4 - 8 4 g/dL    Albumin 4 6 3 5 - 5 0 g/dL    Total Bilirubin 0 76 0 20 - 1 00 mg/dL    eGFR 93 ml/min/1 73sq m   CBC and differential    Collection Time: 05/15/22  9:16 PM   Result Value Ref Range    WBC 25 09 (H) 4 31 - 10 16 Thousand/uL    RBC 4 82 3 81 - 5 12 Million/uL    Hemoglobin 11 2 (L) 11 5 - 15 4 g/dL    Hematocrit 32 7 (L) 34 8 - 46 1 %    MCV 68 (L) 82 - 98 fL    MCH 23 2 (L) 26 8 - 34 3 pg    MCHC 34 3 31 4 - 37 4 g/dL    RDW 13 4 11 6 - 15 1 %    MPV 10 5 8 9 - 12 7 fL    Platelets 194 714 - 385 Thousands/uL   Lactic acid    Collection Time: 05/15/22  9:16 PM   Result Value Ref Range    LACTIC ACID 1 6 0 5 - 2 0 mmol/L   Blood culture #2 Collection Time: 05/15/22  9:16 PM    Specimen: Arm, Right; Blood   Result Value Ref Range    Blood Culture No Growth After 5 Days  COVID/FLU/RSV - 2 hour TAT    Collection Time: 05/15/22  9:16 PM    Specimen: Nose; Nares   Result Value Ref Range    SARS-CoV-2 Negative Negative    INFLUENZA A PCR Negative Negative    INFLUENZA B PCR Negative Negative    RSV PCR Negative Negative   Manual Differential(PHLEBS Do Not Order)    Collection Time: 05/15/22  9:16 PM   Result Value Ref Range    Segmented % 81 (H) 43 - 75 %    Lymphocytes % 10 (L) 14 - 44 %    Monocytes % 9 4 - 12 %    Eosinophils, % 0 0 - 6 %    Basophils % 0 0 - 1 %    Absolute Neutrophils 20 32 (H) 1 85 - 7 62 Thousand/uL    Lymphocytes Absolute 2 51 0 60 - 4 47 Thousand/uL    Monocytes Absolute 2 26 (H) 0 00 - 1 22 Thousand/uL    Eosinophils Absolute 0 00 0 00 - 0 40 Thousand/uL    Basophils Absolute 0 00 0 00 - 0 10 Thousand/uL    Total Counted      RBC Morphology Present     Anisocytosis Present     Ovalocytes Present     Platelet Estimate Adequate Adequate    Large Platelet Present    Blood culture #1    Collection Time: 05/15/22  9:23 PM    Specimen: Arm, Left; Blood   Result Value Ref Range    Blood Culture No Growth After 5 Days      hCG, qualitative pregnancy    Collection Time: 05/15/22  9:50 PM   Result Value Ref Range    Preg, Serum Positive (A) Negative   UA w Reflex to Microscopic w Reflex to Culture    Collection Time: 05/15/22 10:34 PM    Specimen: Urine, Clean Catch   Result Value Ref Range    Color, UA Yellow Yellow    Clarity, UA Slightly Cloudy (A) Clear    Specific [place], UA 1 010 1 001 - 1 030    pH, UA 7 5 5 0, 5 5, 6 0, 6 5, 7 0, 7 5, 8 0    Leukocytes, UA 3+ (A) Negative    Nitrite, UA Negative Negative    Protein, UA Trace (A) Negative, Interference- unable to analyze mg/dl    Glucose, UA Negative Negative mg/dl    Ketones, UA 40 (2+) (A) Negative mg/dl    Urobilinogen, UA 0 2 0 2, 1 0 E U /dl E U /dl    Bilirubin, UA Negative Negative    Occult Blood, UA 2+ (A) Negative    URINE COMMENT     Urine Microscopic    Collection Time: 05/15/22 10:34 PM   Result Value Ref Range    RBC, UA 4-10 (A) None Seen, 0-1, 1-2, 2-4, 0-5 /hpf    WBC, UA 20-30 (A) None Seen, 0-1, 1-2, 0-5, 2-4 /hpf    Epithelial Cells Moderate (A) None Seen, Occasional /hpf    Bacteria, UA Occasional None Seen, Occasional /hpf    WBC Clumps present     OTHER OBSERVATIONS Trichomonas Organisms Present     URINE COMMENT     Urine culture    Collection Time: 05/15/22 10:34 PM    Specimen: Urine, Clean Catch   Result Value Ref Range    Urine Culture 50,000-59,000 cfu/ml Escherichia coli (A)        Susceptibility    Escherichia coli - DAVID     ZID Performed Yes       Amoxicillin + Clavulanate <=8/4 Susceptible ug/ml     Ampicillin ($$) >16 00 Resistant ug/ml     Ampicillin + Sulbactam ($) >16/8 Resistant ug/ml     Aztreonam ($$$)  <=4 Susceptible ug/ml     Cefazolin ($) 4 00 Susceptible ug/ml     Ciprofloxacin ($)  <=0 25 Susceptible ug/ml     Ertapenem ($$$) <=0 5 Susceptible ug/ml     Gentamicin ($$) <=2 Susceptible ug/ml     Levofloxacin ($) <=0 50 Susceptible ug/ml     Nitrofurantoin <=32 Susceptible ug/ml     Piperacillin + Tazobactam ($$$) <=8 Susceptible ug/ml     Tetracycline <=4 Susceptible ug/ml     Tobramycin ($) <=2 Susceptible ug/ml     Trimethoprim + Sulfamethoxazole ($$$) <=0 5/9 5 Susceptible ug/ml   CBC and differential    Collection Time: 05/16/22  4:30 AM   Result Value Ref Range    WBC 20 00 (H) 4 31 - 10 16 Thousand/uL    RBC 4 57 3 81 - 5 12 Million/uL    Hemoglobin 10 6 (L) 11 5 - 15 4 g/dL    Hematocrit 31 6 (L) 34 8 - 46 1 %    MCV 69 (L) 82 - 98 fL    MCH 23 2 (L) 26 8 - 34 3 pg    MCHC 33 5 31 4 - 37 4 g/dL    RDW 13 6 11 6 - 15 1 %    MPV 11 3 8 9 - 12 7 fL    Platelets 934 556 - 922 Thousands/uL    nRBC 0 /100 WBCs    Neutrophils Relative 76 (H) 43 - 75 %    Immat GRANS % 1 0 - 2 %    Lymphocytes Relative 13 (L) 14 - 44 %    Monocytes Relative 10 4 - 12 %    Eosinophils Relative 0 0 - 6 %    Basophils Relative 0 0 - 1 %    Neutrophils Absolute 15 37 (H) 1 85 - 7 62 Thousands/µL    Immature Grans Absolute 0 11 0 00 - 0 20 Thousand/uL    Lymphocytes Absolute 2 52 0 60 - 4 47 Thousands/µL    Monocytes Absolute 1 93 (H) 0 17 - 1 22 Thousand/µL    Eosinophils Absolute 0 02 0 00 - 0 61 Thousand/µL    Basophils Absolute 0 05 0 00 - 0 10 Thousands/µL   Basic metabolic panel    Collection Time: 05/16/22  4:30 AM   Result Value Ref Range    Sodium 135 135 - 147 mmol/L    Potassium 3 9 3 5 - 5 3 mmol/L    Chloride 102 96 - 108 mmol/L    CO2 22 21 - 32 mmol/L    ANION GAP 11 4 - 13 mmol/L    BUN 4 (L) 5 - 25 mg/dL    Creatinine 0 66 0 60 - 1 30 mg/dL    Glucose 97 65 - 140 mg/dL    Glucose, Fasting 97 65 - 99 mg/dL    Calcium 9 5 8 4 - 10 2 mg/dL    eGFR 120 ml/min/1 73sq m   Lipase    Collection Time: 05/16/22  4:30 AM   Result Value Ref Range    Lipase <6 (L) 11 - 82 u/L   C-reactive protein    Collection Time: 05/16/22  4:30 AM   Result Value Ref Range     4 (H) <3 0 mg/L   Lipase    Collection Time: 05/16/22  8:37 PM   Result Value Ref Range    Lipase 8 (L) 11 - 82 u/L   Lactic acid, plasma    Collection Time: 05/16/22  8:37 PM   Result Value Ref Range    LACTIC ACID 0 7 0 5 - 2 0 mmol/L   CBC and Platelet    Collection Time: 05/16/22  8:37 PM   Result Value Ref Range    WBC 16 29 (H) 4 31 - 10 16 Thousand/uL    RBC 4 45 3 81 - 5 12 Million/uL    Hemoglobin 10 3 (L) 11 5 - 15 4 g/dL    Hematocrit 30 6 (L) 34 8 - 46 1 %    MCV 69 (L) 82 - 98 fL    MCH 23 1 (L) 26 8 - 34 3 pg    MCHC 33 7 31 4 - 37 4 g/dL    RDW 13 8 11 6 - 15 1 %    Platelets 555 841 - 462 Thousands/uL    MPV 11 5 8 9 - 12 7 fL   Basic metabolic panel    Collection Time: 05/16/22  8:37 PM   Result Value Ref Range    Sodium 132 (L) 135 - 147 mmol/L    Potassium 3 8 3 5 - 5 3 mmol/L    Chloride 103 96 - 108 mmol/L    CO2 21 21 - 32 mmol/L    ANION GAP 8 4 - 13 mmol/L    BUN 4 (L) 5 - 25 mg/dL Creatinine 0 65 0 60 - 1 30 mg/dL    Glucose 104 65 - 140 mg/dL    Calcium 9 5 8 4 - 10 2 mg/dL    eGFR 121 ml/min/1 73sq m   CBC    Collection Time: 05/17/22  5:08 AM   Result Value Ref Range    WBC 13 78 (H) 4 31 - 10 16 Thousand/uL    RBC 4 20 3 81 - 5 12 Million/uL    Hemoglobin 9 7 (L) 11 5 - 15 4 g/dL    Hematocrit 28 9 (L) 34 8 - 46 1 %    MCV 69 (L) 82 - 98 fL    MCH 23 1 (L) 26 8 - 34 3 pg    MCHC 33 6 31 4 - 37 4 g/dL    RDW 13 8 11 6 - 15 1 %    Platelets 085 770 - 997 Thousands/uL    MPV 11 3 8 9 - 12 7 fL   Comprehensive metabolic panel    Collection Time: 05/17/22  5:08 AM   Result Value Ref Range    Sodium 135 135 - 147 mmol/L    Potassium 4 1 3 5 - 5 3 mmol/L    Chloride 103 96 - 108 mmol/L    CO2 22 21 - 32 mmol/L    ANION GAP 10 4 - 13 mmol/L    BUN 2 (L) 5 - 25 mg/dL    Creatinine 0 72 0 60 - 1 30 mg/dL    Glucose 82 65 - 140 mg/dL    Glucose, Fasting 82 65 - 99 mg/dL    Calcium 9 0 8 4 - 10 2 mg/dL    AST 7 (L) 13 - 39 U/L    ALT 5 (L) 7 - 52 U/L    Alkaline Phosphatase 39 34 - 104 U/L    Total Protein 6 7 6 4 - 8 4 g/dL    Albumin 3 8 3 5 - 5 0 g/dL    Total Bilirubin 0 38 0 20 - 1 00 mg/dL    eGFR 114 ml/min/1 73sq m   hCG, quantitative    Collection Time: 05/17/22  5:08 AM   Result Value Ref Range    HCG, Quant 5,246 (H) 0 - 11 6 mIU/mL   CBC and differential    Collection Time: 05/18/22  4:56 AM   Result Value Ref Range    WBC 10 51 (H) 4 31 - 10 16 Thousand/uL    RBC 3 91 3 81 - 5 12 Million/uL    Hemoglobin 8 9 (L) 11 5 - 15 4 g/dL    Hematocrit 27 6 (L) 34 8 - 46 1 %    MCV 71 (L) 82 - 98 fL    MCH 22 8 (L) 26 8 - 34 3 pg    MCHC 32 2 31 4 - 37 4 g/dL    RDW 13 9 11 6 - 15 1 %    MPV 11 2 8 9 - 12 7 fL    Platelets 949 699 - 752 Thousands/uL    nRBC 0 /100 WBCs    Neutrophils Relative 69 43 - 75 %    Immat GRANS % 1 0 - 2 %    Lymphocytes Relative 20 14 - 44 %    Monocytes Relative 8 4 - 12 %    Eosinophils Relative 2 0 - 6 %    Basophils Relative 0 0 - 1 %    Neutrophils Absolute 7 22 1 85 - 7 62 Thousands/µL    Immature Grans Absolute 0 07 0 00 - 0 20 Thousand/uL    Lymphocytes Absolute 2 10 0 60 - 4 47 Thousands/µL    Monocytes Absolute 0 85 0 17 - 1 22 Thousand/µL    Eosinophils Absolute 0 23 0 00 - 0 61 Thousand/µL    Basophils Absolute 0 04 0 00 - 0 10 Thousands/µL   Comprehensive metabolic panel    Collection Time: 05/18/22  4:56 AM   Result Value Ref Range    Sodium 133 (L) 135 - 147 mmol/L    Potassium 3 9 3 5 - 5 3 mmol/L    Chloride 104 96 - 108 mmol/L    CO2 20 (L) 21 - 32 mmol/L    ANION GAP 9 4 - 13 mmol/L    BUN 3 (L) 5 - 25 mg/dL    Creatinine 0 67 0 60 - 1 30 mg/dL    Glucose 70 65 - 140 mg/dL    Calcium 8 8 8 4 - 10 2 mg/dL    AST 10 (L) 13 - 39 U/L    ALT 7 7 - 52 U/L    Alkaline Phosphatase 39 34 - 104 U/L    Total Protein 6 4 6 4 - 8 4 g/dL    Albumin 3 5 3 5 - 5 0 g/dL    Total Bilirubin 0 33 0 20 - 1 00 mg/dL    eGFR 120 ml/min/1 73sq m   hCG, quantitative    Collection Time: 05/19/22 11:05 AM   Result Value Ref Range    HCG, Quant 6,010 (H) <=6 mIU/mL   hCG, quantitative    Collection Time: 05/21/22  9:40 AM   Result Value Ref Range    HCG, Quant 13,556 (H) 0 - 11 6 mIU/mL   Chlamydia/GC amplified DNA by PCR    Collection Time: 05/21/22  9:40 AM    Specimen: Urine, Other   Result Value Ref Range    N gonorrhoeae, DNA Probe Negative Negative    Chlamydia trachomatis, DNA Probe Negative Negative   Liquid-based pap, screening    Collection Time: 05/25/22  1:38 PM   Result Value Ref Range    Case Report       Gynecologic Cytology Report                       Case: [id number]                                  Authorizing Provider:  Julia Kidd MD          Collected:           05/25/2022 [id number]              Ordering Location:     [facility]    Received:            05/25/2022 8936                                     [facility]                                                             First Screen:          Timothy Page Specimen:    LIQUID-BASED PAP, SCREENING, Cervix                                                        Primary Interpretation Negative for intraepithelial lesion or malignancy     Specimen Adequacy       Satisfactory for evaluation  Absence of endocervical/transformation zone component  Additional Information       [facility]'s FDA approved ,  and ThinPrep Imaging Duo System are utilized with strict adherence to the 's instruction manual to prepare gynecologic and non-gynecologic cytology specimens for the production of ThinPrep slides as well as for gynecologic ThinPrep imaging  These processes have been validated by our laboratory and/or by the   The Pap test is not a diagnostic procedure and should not be used as the sole means to detect cervical cancer  It is only a screening procedure to aid in the detection of cervical cancer and its precursors  Both false-negative and false-positive results have been experienced  Your patient's test result should be interpreted in this context together with the history and clinical findings       POCT urine dip    Collection Time: 05/25/22  3:21 PM   Result Value Ref Range    LEUKOCYTE ESTERASE,UA neg     NITRITE,UA neg     POCT URINE PROTEIN neg     GLUCOSE, UA neg    FLU/RSV/COVID - if FLU/RSV clinically relevant    Collection Time: 07/08/22  1:08 PM    Specimen: Nose; Nares   Result Value Ref Range    SARS-CoV-2 Positive (A) Negative    INFLUENZA A PCR Negative Negative    INFLUENZA B PCR Negative Negative    RSV PCR Negative Negative   VAGINOSIS DNA PROBE (AFFIRM)    Collection Time: 07/18/22  2:41 PM    Specimen: Vaginal; Genital   Result Value Ref Range    Candida Species Negative Negative    Gardnerella vaginalis Positive (A) Negative    Trichomonas vaginalis Negative Negative   hCG, quantitative    Collection Time: 07/25/22  1:04 PM   Result Value Ref Range    HCG, Quant 219 (H) 0 - 11 6 mIU/mL   Type and screen Collection Time: 07/25/22  1:04 PM   Result Value Ref Range    ABO Grouping B     Rh Factor Positive     Antibody Screen Negative     Specimen Expiration Date [id number]        Laboratory Results: I have personally reviewed the pertinent laboratory results/reports         Assessment/Plan:  Problem List Items Addressed This Visit        Nervous and Auditory    Left-sided Bell's palsy       Other    Mixed anxiety depressive disorder - Primary    Relevant Medications    sertraline (Zoloft) 25 mg tablet    hydrOXYzine HCL (ATARAX) 10 mg tablet    Other Relevant Orders    Ambulatory Referral to Psychiatry    Ambulatory Referral to [facility]    Comprehensive metabolic panel    CBC and differential    TSH, 3rd generation with Free T4 reflex   Other Visit Diagnoses     Need for hepatitis C screening test        Relevant Orders    Hepatitis C Antibody ([facility])    Influenza vaccine refused        Screening for HIV (human immunodeficiency virus)        Relevant Orders    HIV 1/2 AG/AB w Reflex [facility] for over 2 yr old    Annual physical exam            Brief counseling fr anxiety and depression provided  Check labs  Recommend starting sertraline daily   I have discussed FDA blackbox warning of increased risk of suicides with SSRI's and abruptly stopping it or changing the dose  He verbalized understanding and is accepting of the risks  Refer to psychiatry and therpaist  Given the risk of smoking THC, recommend tincture or CBD gummies in discussion with prescribing physician  Hydroxyzine prn for insomnia and anxiety attacks  F/u in 3months             Read package inserts for all medications before starting a new medications, call me if you have any questions  Patient was given opportunity to ask questions and all questions were answered  Disclaimer: Portions of the record may have been created with voice recognition software   Occasional wrong word or "sound a like" substitutions may have occurred due to the inherent limitations of voice recognition software  Read the chart carefully and recognize, using context, where substitutions have occurred  I have used the Epic copy/forward function to compose this note  I have reviewed my current note to ensure it reflects the current patient status, exam, assessment and plan

## 2022-12-16 NOTE — PROGRESS NOTES
[facility]    NAME: Jesse Cazares  AGE: 28 y o  SEX: female  : 1995     DATE: 2022     Assessment and Plan:     Problem List Items Addressed This Visit        Nervous and Auditory    Left-sided Bell's palsy       Other    Mixed anxiety depressive disorder - Primary    Relevant Medications    sertraline (Zoloft) 25 mg tablet    hydrOXYzine HCL (ATARAX) 10 mg tablet    Other Relevant Orders    Ambulatory Referral to Psychiatry    Ambulatory Referral to [facility]    Comprehensive metabolic panel    CBC and differential    TSH, 3rd generation with Free T4 reflex   Other Visit Diagnoses     Need for hepatitis C screening test        Relevant Orders    Hepatitis C Antibody ([facility])    Influenza vaccine refused        Screening for HIV (human immunodeficiency virus)        Relevant Orders    HIV 1/2 AG/AB w Reflex [facility] for over 2 yr old    Annual physical exam              Immunizations and preventive care screenings were discussed with patient today  Appropriate education was printed on patient's after visit summary  Counseling:  Alcohol/drug use: discussed moderation in alcohol intake, the recommendations for healthy alcohol use, and avoidance of illicit drug use  Dental Health: discussed importance of regular tooth brushing, flossing, and dental visits  Sexual health: discussed sexually transmitted diseases, partner selection, use of condoms, avoidance of unintended pregnancy, and contraceptive alternatives  Exercise: the importance of regular exercise/physical activity was discussed  Recommend exercise 3-5 times per week for at least 30 minutes  · THC use-advised to use non smoking alternatives      Tobacco Cessation Counseling: Tobacco cessation counseling was provided   The patient is sincerely urged to quit consumption of tobacco  She is ready to quit tobacco          Return in about 3 months (around 3/15/2023) for Next scheduled follow up  Chief Complaint:     Chief Complaint   Patient presents with   • Anxiety      History of Present Illness:     Adult Annual Physical   Patient here for a comprehensive physical exam  The patient reports problems - anxiety and depression-see EM visit  Diet and Physical Activity  · Diet/Nutrition: well balanced diet, limited junk food and limited fruits/vegetables  · Exercise: moderate cardiovascular exercise  Depression Screening  PHQ-2/9 Depression Screening    Little interest or pleasure in doing things: 2 - more than half the days  Feeling down, depressed, or hopeless: 1 - several days  Trouble falling or staying asleep, or sleeping too much: 2 - more than half the days  Feeling tired or having little energy: 3 - nearly every day  Poor appetite or overeating: 3 - nearly every day  Feeling bad about yourself - or that you are a failure or have let yourself or your family down: 1 - several days  Trouble concentrating on things, such as reading the newspaper or watching television: 1 - several days  Moving or speaking so slowly that other people could have noticed  Or the opposite - being so fidgety or restless that you have been moving around a lot more than usual: 1 - several days  Thoughts that you would be better off dead, or of hurting yourself in some way: 0 - not at all  PHQ-2 Score: 3  PHQ-2 Interpretation: POSITIVE depression screen  PHQ-9 Score: 14   PHQ-9 Interpretation: Moderate depression        General Health  · Sleep: sleeps well  · Hearing: grossly normal   · Vision: goes for regular eye exams  · Dental: no dental visits for >1 year and brushes teeth twice daily  /GYN Health:follows with Gyn  · Last menstrual period:   · Contraceptive method: none  Review of Systems:     Review of Systems   Constitutional: Negative for activity change, appetite change, chills, diaphoresis, fatigue and fever     HENT: Negative for congestion, facial swelling, mouth sores, rhinorrhea, sinus pressure, sneezing, sore throat, tinnitus, trouble swallowing and voice change  Eyes: Negative for pain, discharge, redness and visual disturbance  Respiratory: Negative for cough, shortness of breath and wheezing  Cardiovascular: Negative for chest pain, palpitations and leg swelling  Gastrointestinal: Negative for abdominal pain, blood in stool, constipation, diarrhea and nausea  Endocrine: Negative for cold intolerance and heat intolerance  Genitourinary: Negative for dysuria, hematuria and urgency  Musculoskeletal: Negative for arthralgias, back pain and myalgias  Skin: Negative for rash and wound  Allergic/Immunologic: Negative for environmental allergies and food allergies  Neurological: Negative for dizziness, tremors, seizures, syncope, facial asymmetry, speech difficulty, weakness, light-headedness, numbness and headaches  Hematological: Negative for adenopathy  Psychiatric/Behavioral: Negative for agitation, behavioral problems, sleep disturbance and suicidal ideas  The patient is nervous/anxious  Past Medical History:     Past Medical History:   Diagnosis Date   • Anemia    • Anxiety    • Asthma    • Chronic kidney disease    • Gonorrhea    • Urogenital trichomoniasis       Past Surgical History:     Past Surgical History:   Procedure Laterality Date   • DILATION AND CURETTAGE OF UTERUS        Social History:     Social History     Socioeconomic History   • Marital status: /Civil Union     Spouse name: None   • Number of children: None   • Years of education: None   • Highest education level: None   Occupational History   • None   Tobacco Use   • Smoking status: Some Days   • Smokeless tobacco: Never   Vaping Use   • Vaping Use: Never used   Substance and Sexual Activity   • Alcohol use:  Yes     Alcohol/week: 2 0 standard drinks     Types: 1 Glasses of wine, 1 Shots of liquor per week   • Drug use: Yes     Frequency: 3 0 times per week     Types: Marijuana     Comment: daily   • Sexual activity: Yes     Partners: Male     Birth control/protection: None   Other Topics Concern   • None   Social History Narrative   • None     Social Determinants of Health     Financial Resource Strain: Not on file   Food Insecurity: No Food Insecurity   • Worried About Running Out of Food in the Last Year: Never true   • Ran Out of Food in the Last Year: Never true   Transportation Needs: No Transportation Needs   • Lack of Transportation (Medical): No   • Lack of Transportation (Non-Medical): No   Physical Activity: Not on file   Stress: Not on file   Social Connections: Not on file   Intimate Partner Violence: Not on file   Housing Stability: Low Risk    • Unable to Pay for Housing in the Last Year: No   • Number of Places Lived in the Last Year: 1   • Unstable Housing in the Last Year: No      Family History:     Family History   Problem Relation Age of Onset   • Sickle cell trait Mother    • No Known Problems Father    • Heart murmur Brother    • No Known Problems Son    • Diabetes Maternal Grandmother    • Hypertension Maternal Grandmother    • Stroke Maternal Grandmother    • No Known Problems Maternal Grandfather    • No Known Problems Paternal Grandmother    • No Known Problems Paternal Grandfather    • No Known Problems Half-Sister    • No Known Problems Half-Sister    • No Known Problems Half-Sister    • No Known Problems Half-Brother       Current Medications:     Current Outpatient Medications   Medication Sig Dispense Refill   • hydrOXYzine HCL (ATARAX) 10 mg tablet Take 1 tablet (10 mg total) by mouth every 8 (eight) hours as needed for anxiety 30 tablet 0   • sertraline (Zoloft) 25 mg tablet Take 1 tablet (25 mg total) by mouth daily 30 tablet 2     No current facility-administered medications for this visit        Allergies:     No Known Allergies   Physical Exam:     /72 (BP Location: Left arm, Patient Position: Sitting, Cuff Size: Adult)   Pulse 82   Temp 97 8 °F (36 6 °C) (Temporal)   Resp 16   Ht 5' 8" (1 727 m)   Wt 68 9 kg (152 lb)   SpO2 98%   BMI 23 11 kg/m²     Physical Exam  Vitals and nursing note reviewed  Constitutional:       Appearance: She is well-developed  HENT:      Head: Normocephalic and atraumatic  Right Ear: Tympanic membrane, ear canal and external ear normal       Left Ear: Tympanic membrane, ear canal and external ear normal       Nose: Nose normal       Mouth/Throat:      Pharynx: No oropharyngeal exudate  Eyes:      General: No scleral icterus  Right eye: No discharge  Left eye: No discharge  Conjunctiva/sclera: Conjunctivae normal       Pupils: Pupils are equal, round, and reactive to light  Neck:      Thyroid: No thyromegaly  Cardiovascular:      Rate and Rhythm: Normal rate and regular rhythm  Heart sounds: No murmur heard  No gallop  Pulmonary:      Effort: Pulmonary effort is normal  No respiratory distress  Breath sounds: Normal breath sounds  No wheezing or rales  Abdominal:      Palpations: Abdomen is soft  Tenderness: There is no abdominal tenderness  Musculoskeletal:         General: No tenderness or deformity  Cervical back: Normal range of motion  Right lower leg: No edema  Left lower leg: No edema  Lymphadenopathy:      Cervical: No cervical adenopathy  Skin:     General: Skin is warm  Capillary Refill: Capillary refill takes less than 2 seconds  Findings: No erythema or rash  Neurological:      Mental Status: She is alert and oriented to person, place, and time  Cranial Nerves: Facial asymmetry (left bells palsy) present  Deep Tendon Reflexes: Reflexes normal    Psychiatric:         Behavior: Behavior normal          Thought Content:  Thought content normal          Judgment: Judgment normal           Kristen Cummings MD   [address] [place] [address]

## 2022-12-16 NOTE — PATIENT INSTRUCTIONS

## 2022-12-22 ENCOUNTER — TELEPHONE (OUTPATIENT)
Dept: PSYCHIATRY | Facility: CLINIC | Age: 27
End: 2022-12-22

## 2022-12-22 NOTE — TELEPHONE ENCOUNTER
Called patient regarding referral to be placed on proper wait list  Unable to LVM for patient to call intake dept ([phone]) back  Mailbox full

## 2022-12-28 ENCOUNTER — TELEPHONE (OUTPATIENT)
Dept: PSYCHIATRY | Facility: CLINIC | Age: 27
End: 2022-12-28

## 2022-12-28 NOTE — TELEPHONE ENCOUNTER
Tried to reach out to pt in regards to referral and placing on prper wait list     pts mailbox was full and could not lvm for pt

## 2023-01-05 NOTE — TELEPHONE ENCOUNTER
Called patient regarding referral to be placed on proper wait list  Unable to LVM for patient to call intake dept ([phone]) back  Mailbox is full